# Patient Record
Sex: MALE | Race: AMERICAN INDIAN OR ALASKA NATIVE | NOT HISPANIC OR LATINO | Employment: FULL TIME | URBAN - METROPOLITAN AREA
[De-identification: names, ages, dates, MRNs, and addresses within clinical notes are randomized per-mention and may not be internally consistent; named-entity substitution may affect disease eponyms.]

---

## 2017-01-18 ENCOUNTER — OFFICE VISIT (OUTPATIENT)
Dept: URGENT CARE | Facility: URGENT CARE | Age: 32
End: 2017-01-18

## 2017-01-18 ENCOUNTER — RADIANT APPOINTMENT (OUTPATIENT)
Dept: GENERAL RADIOLOGY | Facility: CLINIC | Age: 32
End: 2017-01-18
Attending: FAMILY MEDICINE

## 2017-01-18 VITALS
RESPIRATION RATE: 18 BRPM | OXYGEN SATURATION: 97 % | TEMPERATURE: 97.3 F | WEIGHT: 201 LBS | DIASTOLIC BLOOD PRESSURE: 86 MMHG | SYSTOLIC BLOOD PRESSURE: 122 MMHG | HEART RATE: 72 BPM

## 2017-01-18 DIAGNOSIS — S99.921A FOOT INJURY, RIGHT, INITIAL ENCOUNTER: Primary | ICD-10-CM

## 2017-01-18 PROCEDURE — 99203 OFFICE O/P NEW LOW 30 MIN: CPT | Performed by: FAMILY MEDICINE

## 2017-01-18 PROCEDURE — 73630 X-RAY EXAM OF FOOT: CPT | Mod: RT

## 2017-01-18 RX ORDER — HYDROCODONE BITARTRATE AND ACETAMINOPHEN 5; 325 MG/1; MG/1
1 TABLET ORAL EVERY 6 HOURS PRN
Qty: 8 TABLET | Refills: 0 | Status: SHIPPED | OUTPATIENT
Start: 2017-01-18 | End: 2017-01-20

## 2017-01-18 NOTE — PROGRESS NOTES
SUBJECTIVE:  Chief Complaint   Patient presents with     Foot Injury     Injured RT foot last night while playing basket ball.   .ident presents with a chief complaint of right foot.  The injury occurred 1 day ago.   The injury happened while playing.   How: sports related injury immediate pain  The patient complained of moderate pain and has had decreased ROM.    Pain exacerbated by weight-bearing and movement    He treated it initially with ice.   This is the first time this type of injury has occurred to this patient.     Past Medical History   Diagnosis Date     NO ACTIVE PROBLEMS        Past Surgical History   Procedure Laterality Date     No history of surgery         No family history on file.    Social History   Substance Use Topics     Smoking status: Current Every Day Smoker     Smokeless tobacco: Never Used     Alcohol Use: No     ROSINTEGUMENTARY/SKIN: NEGATIVE for open wound/bleeding and NEGATIVE for bruising  MUSCULOSKELETAL: NEGATIVE for joint swelling, paresthesias, radicular pain    EXAM:/86 mmHg  Pulse 72  Temp(Src) 97.3  F (36.3  C) (Oral)  Resp 18  Wt 201 lb (91.173 kg)  SpO2 97% Gen: healthy,alert,no distress  Extremity: foot has pain with palpation.   There is not compromise to the distal circulation.  Pulses are +2 and CRT is brisk.GENERAL APPEARANCE: healthy, alert and no distress  EXTREMITIES: peripheral pulses normal  SKIN: no suspicious lesions or rashes  NEURO: Normal strength and tone, sensory exam grossly normal, mentation intact and speech normal    Xray without acute findings, read by Yoni Farr D.O.        ICD-10-CM    1. Foot injury, right, initial encounter S99.921A XR Foot Right G/E 3 Views     order for [facility]     PODIATRY/FOOT & ANKLE SURGERY REFERRAL     HYDROcodone-acetaminophen (NORCO) 5-325 MG per tablet       RICE

## 2017-01-18 NOTE — MR AVS SNAPSHOT
After Visit Summary   1/18/2017    Nathaniel Ybarra    MRN: [id number]           Patient Information     Date Of Birth          1985        Visit Information        Provider Department      1/18/2017 9:30 AM Yoni Farr, DO [facility]        Today's Diagnoses     Foot injury, right, initial encounter    -  1        Follow-ups after your visit        Additional Services     PODIATRY/FOOT & ANKLE SURGERY REFERRAL       Your provider has referred you to: FMG: [facility] [phone]   [email or web address]  FMG: [facility] [phone]   [email or web address]  FMG: [facility] [phone]   [email or web address]  FMG: [facility] [phone]   [email or web address]    Please be aware that coverage of these services is subject to the terms and limitations of your health insurance plan.  Call member services at your health plan with any benefit or coverage questions.      Please bring the following to your appointment:  >>   Any x-rays, CTs or MRIs which have been performed.  Contact the facility where they were done to arrange for  prior to your scheduled appointment.    >>   List of current medications   >>   This referral request   >>   Any documents/labs given to you for this referral                  Who to contact     If you have questions or need follow up information about today's clinic visit or your schedule please contact [facility] directly at [phone].  Normal or non-critical lab and imaging results will be communicated to you by MyChart, letter or phone within 4 business days after the clinic has received the results. If you do not hear from us within 7 days, please contact the clinic through MyChart or phone. If you  "have a critical or abnormal lab result, we will notify you by phone as soon as possible.  Submit refill requests through [employer] or call your pharmacy and they will forward the refill request to us. Please allow 3 business days for your refill to be completed.          Additional Information About Your Visit        [employer] Information     [employer] lets you send messages to your doctor, view your test results, renew your prescriptions, schedule appointments and more. To sign up, go to [email or web address] . Click on \"Log in\" on the left side of the screen, which will take you to the Welcome page. Then click on \"Sign up Now\" on the right side of the page.     You will be asked to enter the access code listed below, as well as some personal information. Please follow the directions to create your username and password.     Your access code is: [email or web address]  Expires: 2017 10:38 AM     Your access code will  in 90 days. If you need help or a new code, please call your [facility] clinic or [phone].        Care EveryWhere ID     This is your Care EveryWhere ID. This could be used by other organizations to access your [facility] medical records  [id number]        Your Vitals Were     Pulse Temperature Respirations Pulse Oximetry          72 97.3  F (36.3  C) (Oral) 18 97%         Blood Pressure from Last 3 Encounters:   17 122/86    Weight from Last 3 Encounters:   17 201 lb (91.173 kg)              We Performed the Following     PODIATRY/FOOT & ANKLE SURGERY REFERRAL     XR Foot Right G/E 3 Views          Today's Medication Changes          These changes are accurate as of: 17 10:38 AM.  If you have any questions, ask your nurse or doctor.               Start taking these medicines.        Dose/Directions    order for DME   Used for:  Foot injury, right, initial encounter   Started by:  Yoni Farr,         Equipment being ordered: RT short cam walker   Quantity:  1 Device   Refills: "  0            Where to get your medicines      Some of these will need a paper prescription and others can be bought over the counter.  Ask your nurse if you have questions.     Bring a paper prescription for each of these medications    - order for DME             Primary Care Provider    None Specified       No primary provider on file.        Thank you!     Thank you for choosing [facility]  for your care. Our goal is always to provide you with excellent care. Hearing back from our patients is one way we can continue to improve our services. Please take a few minutes to complete the written survey that you may receive in the mail after your visit with us. Thank you!             Your Updated Medication List - Protect others around you: Learn how to safely use, store and throw away your medicines at www.disposemymeds.org.          This list is accurate as of: 1/18/17 10:38 AM.  Always use your most recent med list.                   Brand Name Dispense Instructions for use    order for DME     1 Device    Equipment being ordered: RT short cam walker

## 2017-01-18 NOTE — NURSING NOTE
Chief Complaint   Patient presents with     Foot Injury     Injured RT foot last night while playing basket ball.       Initial /86 mmHg  Pulse 72  Temp(Src) 97.3  F (36.3  C) (Oral)  Resp 18  Wt 201 lb (91.173 kg)  SpO2 97% There is no height on file to calculate BMI.  BP completed using cuff size: regular

## 2021-02-13 ENCOUNTER — APPOINTMENT (OUTPATIENT)
Dept: CT IMAGING | Facility: CLINIC | Age: 36
End: 2021-02-13
Attending: EMERGENCY MEDICINE
Payer: MEDICAID

## 2021-02-13 ENCOUNTER — HOSPITAL ENCOUNTER (EMERGENCY)
Facility: CLINIC | Age: 36
Discharge: HOME | End: 2021-02-13
Attending: EMERGENCY MEDICINE | Admitting: EMERGENCY MEDICINE
Payer: MEDICAID

## 2021-02-13 VITALS
HEART RATE: 105 BPM | TEMPERATURE: 98.7 F | HEIGHT: 68 IN | SYSTOLIC BLOOD PRESSURE: 128 MMHG | BODY MASS INDEX: 28.79 KG/M2 | RESPIRATION RATE: 16 BRPM | OXYGEN SATURATION: 98 % | WEIGHT: 190 LBS | DIASTOLIC BLOOD PRESSURE: 102 MMHG

## 2021-02-13 DIAGNOSIS — K57.32 DIVERTICULITIS OF COLON: ICD-10-CM

## 2021-02-13 LAB
ALBUMIN UR-MCNC: NEGATIVE MG/DL
ANION GAP SERPL CALCULATED.3IONS-SCNC: 8 MMOL/L (ref 3–14)
APPEARANCE UR: CLEAR
BASOPHILS # BLD AUTO: 0.1 10E9/L (ref 0–0.2)
BASOPHILS NFR BLD AUTO: 0.4 %
BILIRUB UR QL STRIP: NEGATIVE
BUN SERPL-MCNC: 11 MG/DL (ref 7–30)
CALCIUM SERPL-MCNC: 8.7 MG/DL (ref 8.5–10.1)
CHLORIDE SERPL-SCNC: 107 MMOL/L (ref 94–109)
CO2 SERPL-SCNC: 26 MMOL/L (ref 20–32)
COLOR UR AUTO: YELLOW
CREAT SERPL-MCNC: 0.84 MG/DL (ref 0.66–1.25)
DIFFERENTIAL METHOD BLD: ABNORMAL
EOSINOPHIL # BLD AUTO: 0.1 10E9/L (ref 0–0.7)
EOSINOPHIL NFR BLD AUTO: 1.1 %
ERYTHROCYTE [DISTWIDTH] IN BLOOD BY AUTOMATED COUNT: 11.6 % (ref 10–15)
GFR SERPL CREATININE-BSD FRML MDRD: >90 ML/MIN/{1.73_M2}
GLUCOSE SERPL-MCNC: 91 MG/DL (ref 70–99)
GLUCOSE UR STRIP-MCNC: NEGATIVE MG/DL
HCT VFR BLD AUTO: 46.1 % (ref 40–53)
HGB BLD-MCNC: 15.9 G/DL (ref 13.3–17.7)
HGB UR QL STRIP: NEGATIVE
IMM GRANULOCYTES # BLD: 0 10E9/L (ref 0–0.4)
IMM GRANULOCYTES NFR BLD: 0.3 %
KETONES UR STRIP-MCNC: NEGATIVE MG/DL
LEUKOCYTE ESTERASE UR QL STRIP: NEGATIVE
LYMPHOCYTES # BLD AUTO: 1.4 10E9/L (ref 0.8–5.3)
LYMPHOCYTES NFR BLD AUTO: 11.3 %
MCH RBC QN AUTO: 30.2 PG (ref 26.5–33)
MCHC RBC AUTO-ENTMCNC: 34.5 G/DL (ref 31.5–36.5)
MCV RBC AUTO: 88 FL (ref 78–100)
MONOCYTES # BLD AUTO: 1 10E9/L (ref 0–1.3)
MONOCYTES NFR BLD AUTO: 8.2 %
NEUTROPHILS # BLD AUTO: 9.6 10E9/L (ref 1.6–8.3)
NEUTROPHILS NFR BLD AUTO: 78.7 %
NITRATE UR QL: NEGATIVE
NRBC # BLD AUTO: 0 10*3/UL
NRBC BLD AUTO-RTO: 0 /100
PH UR STRIP: 6.5 PH (ref 5–7)
PLATELET # BLD AUTO: 203 10E9/L (ref 150–450)
POTASSIUM SERPL-SCNC: 3.8 MMOL/L (ref 3.4–5.3)
RBC # BLD AUTO: 5.26 10E12/L (ref 4.4–5.9)
RBC #/AREA URNS AUTO: 0 /HPF (ref 0–2)
SODIUM SERPL-SCNC: 141 MMOL/L (ref 133–144)
SOURCE: NORMAL
SP GR UR STRIP: 1.02 (ref 1–1.03)
UROBILINOGEN UR STRIP-MCNC: 0 MG/DL (ref 0–2)
WBC # BLD AUTO: 12.3 10E9/L (ref 4–11)
WBC #/AREA URNS AUTO: <1 /HPF (ref 0–5)

## 2021-02-13 PROCEDURE — 80048 BASIC METABOLIC PNL TOTAL CA: CPT | Performed by: EMERGENCY MEDICINE

## 2021-02-13 PROCEDURE — 99284 EMERGENCY DEPT VISIT MOD MDM: CPT | Mod: 25

## 2021-02-13 PROCEDURE — 74176 CT ABD & PELVIS W/O CONTRAST: CPT

## 2021-02-13 PROCEDURE — 85025 COMPLETE CBC W/AUTO DIFF WBC: CPT | Performed by: EMERGENCY MEDICINE

## 2021-02-13 PROCEDURE — 81001 URINALYSIS AUTO W/SCOPE: CPT | Performed by: EMERGENCY MEDICINE

## 2021-02-13 ASSESSMENT — ENCOUNTER SYMPTOMS
VOMITING: 0
BACK PAIN: 0
FREQUENCY: 1
ABDOMINAL PAIN: 1
NAUSEA: 0
CONSTIPATION: 0
FEVER: 0
HEMATURIA: 0
DYSURIA: 0
DIARRHEA: 0

## 2021-02-13 ASSESSMENT — MIFFLIN-ST. JEOR: SCORE: 1771.33

## 2021-02-13 NOTE — ED PROVIDER NOTES
"  History     Chief Complaint:  Abdominal Pain and Testicular/scrotal Pain     HPI:   Nathaniel Ybarra is a 35 year old male who presents with worsening low abdominal pain and testicular pain for the last 4 days. Patient states that he has episodes every 2-5 minutes with cramping abdominal pain that radiates to his testicles. He has intermittent periods with no pain. His pain is worse today and he has also had increased urinary frequency. He also mentions that his food and fluid intake have been somewhat reduced today. Denies any inciting trauma, fevers, leg swelling, nausea or vomiting, back pain, hematuria, dysuria, penile discharge, diarrhea, constipation, history of abdominal surgeries or renal stones.    Review of Systems   Constitutional: Negative for fever.   Cardiovascular: Negative for leg swelling.   Gastrointestinal: Positive for abdominal pain. Negative for constipation, diarrhea, nausea and vomiting.   Genitourinary: Positive for frequency and testicular pain. Negative for discharge, dysuria and hematuria.   Musculoskeletal: Negative for back pain.   All other systems reviewed and are negative.    Allergies:  No known drug allergies     Medications:    The patient is not currently taking any prescribed medications.    Past Medical History:  Hypertension    Social History:  Patient presents with partner.    Physical Exam     Vitals:  Patient Vitals for the past 24 hrs:   BP Temp Temp src Pulse Resp SpO2 Height Weight   02/13/21 1408 (!) 128/102 98.7  F (37.1  C) Temporal 105 16 98 % 1.727 m (5' 8\") 86.2 kg (190 lb)       Physical Exam   Constitutional:  Patient is oriented to person, place, and time. They appear well-developed and well-nourished. Mild distress secondary to abdominal and testicular pain   HENT:   Mouth/Throat:   Oropharynx is clear and moist.   Eyes:    Conjunctivae normal and EOM are normal. Pupils are equal, round, and reactive to light.   Neck:    Normal range of motion. "   Cardiovascular: Normal rate, regular rhythm and normal heart sounds.  Exam reveals no gallop and no friction rub.  No murmur heard.  Pulmonary/Chest:  Effort normal and breath sounds normal. Patient has no wheezes. Patient has no rales.   Abdominal:   Soft. Bowel sounds are normal. Patient exhibits no mass. Pain in low mid-pelvic area to palpation. There is no rebound and no guarding.   :   No redness, masses, reproducible tenderness, no lesions.  Musculoskeletal:  Normal range of motion. Patient exhibits no edema.   Neurological:   Patient is alert and oriented to person, place, and time. Patient has normal strength. No cranial nerve deficit or sensory deficit. GCS 15  Skin:   Skin is warm and dry. No rash noted. No erythema.   Psychiatric:   Patient has a normal mood and affect. Patient's behavior is normal. Judgment and thought content normal.     Emergency Department Course     Imaging:  CT Abdomen/pelvis wo contrast  1.  Acute sigmoid diverticulitis.  2.  No evidence for associated diverticular abscess.  Per radiology.    Laboratory:  CBC: WBC 12.3 (H), HGB 15.9,     BMP:  WNL (Creatinine 0.84)    Urine analysis: WNL    Emergency Department Course:  Reviewed:  1418  Past medical records, Care Everywhere, nursing notes, and vitals reviewed.     Assessments:  1418 I performed an exam of the patient and obtained history, as documented above.    Disposition:  1623 The patient was discharged to home.     I personally reviewed the laboratory & imaging results with the Patient who voiced understanding of the findings. I answered all related questions prior to discharge.     Impression & Plan     Medical Decision Making:   Nathaniel Ybarra presented with abdominal pain and CT confirms diverticulitis without abscess or perforation.  This is consistent with his exam. No concern for testicular pathology base don exam, I believe the sigmoid diverticulitis is radiating pain. His pain is controlled here in the  emergency department.  This represents uncomplicated diverticulitis at this time.  The natural history of this diagnosis was discussed, and I educated the patient regarding the symptoms and signs that should prompt return to the Emergency Department.  This would include worsening fevers, chills, vomiting, and more intense pain.  Nathaniel is to take antibiotics and pain medications as directed.  Follow-up with GI/primary care physician is indicated in 1-2 days, referral proivided.    Diagnosis:    ICD-10-CM    1. Diverticulitis of colon  K57.32         Discharge Medications:  Discharge Medication List as of 2/13/2021  4:45 PM      START taking these medications    Details   amoxicillin-clavulanate (AUGMENTIN) 875-125 MG tablet Take 1 tablet by mouth 2 times daily for 10 days, Disp-20 tablet, R-0, Local Print             Scribe Disclosure:  I, Maria Luisa Glaser, am serving as a scribe at 2:18 PM on 2/13/2021 to document services personally performed by Latanya Wright MD based on my observations and the provider's statements to me.       Maria Luisa Glaser  2/13/2021     Latanya Wright MD  02/13/21 5531

## 2021-02-13 NOTE — ED TRIAGE NOTES
Lower mid abdominal pain that radiates into the testicles. States he has had for a couple days, pain worse in the AM. Pt denies any urinary symptoms.

## 2021-02-15 ENCOUNTER — NURSE TRIAGE (OUTPATIENT)
Dept: NURSING | Facility: CLINIC | Age: 36
End: 2021-02-15

## 2021-02-15 NOTE — TELEPHONE ENCOUNTER
"RN Triage:  NO PCP or Clinic    Spoke with 35 yr old Nathaniel who was seen at [facility] ER 2 days ago and dx with diverticulitis.    Prescribed Augmentin x 10 days and advised to follow up with Gastroenterology.    Patient states he woke up with sore eyes this morning.  \"felt like he was punched in the eyes.\"    Denies swelling.    Whites of the eyes are slightly red.  Rates discomfort a 4-5 on a 0-10 pain this morning.  Feels pain is improving.    Denies drainage.    Denies fever.    Denies vision changes.    Reports feeling very tired last night, but kept his eyes open and watched quite a bit of TV.    Wondering if he is having side effects from the AB?    PLAN:  Advised to contact pharmacist at [facility] and inquire if eye strain/photosensitiviy is a side effect of Augmentin.  Advised to make appointment Gastroenterology and follow up with questions/concerns.  Advised to monitor eye discomfort and if pain worsens to be seen in Urgent Care.  Advised if eye discomfort worsens or continues beyond 24 hours to call back.    Sharla Knapp RN  [facility]      Additional Information    Negative: Chemical got in the eye    Negative: Piece of something got in the eye    Negative: Followed an eye injury    Negative: Yellow or green pus in the eyes    Negative: Severe eye pain    Negative: Patient sounds very sick or weak to the triager    Negative: Blurred vision    Negative: Cloudy spot or sore seen on the cornea (clear part of the eye)    Negative: Eyelids are very swollen (shut or almost)    Negative: Eyelid (outer) is very red    Negative: Vomiting    Negative: Foreign body sensation ('feels like something is in there')    Negative: Recent eye surgery and has increasing eye pain    Negative: Eye pain/discomfort that is more than mild    Negative: Patient wants to be seen    Negative: Eye pain present > 24 hours    Negative: Bleeding on white of the eye and is taking Coumadin or known bleeding disorder (e.g., " thrombocytopenia)    Negative: Only 1 eye is red, and persists > 48 hours    Negative: Red eyes present > 7 days    Negative: Bleeding on white of the eye    Negative: Red eye caused by sunscreen, smoke, smog, chlorine, food, soap or other mild irritant    Negative: Red eye caused by contact lens    Red eye and no complications    Protocols used: EYE - RED WITHOUT PUS-A-[place]

## 2021-03-12 ENCOUNTER — TRANSFERRED RECORDS (OUTPATIENT)
Dept: HEALTH INFORMATION MANAGEMENT | Facility: CLINIC | Age: 36
End: 2021-03-12

## 2021-05-15 ENCOUNTER — HOSPITAL ENCOUNTER (EMERGENCY)
Dept: EMERGENCY MEDICINE | Facility: CLINIC | Age: 36
Discharge: HOME | End: 2021-05-16
Attending: EMERGENCY MEDICINE
Payer: MEDICAID

## 2021-05-15 ENCOUNTER — COMMUNICATION - HEALTHEAST (OUTPATIENT)
Dept: SCHEDULING | Facility: CLINIC | Age: 36
End: 2021-05-15

## 2021-05-15 DIAGNOSIS — R07.9 CHEST PAIN, UNSPECIFIED TYPE: ICD-10-CM

## 2021-05-15 LAB
ALBUMIN SERPL-MCNC: 3.7 G/DL (ref 3.5–5)
ALP SERPL-CCNC: 99 U/L (ref 45–120)
ALT SERPL W P-5'-P-CCNC: 53 U/L (ref 0–45)
ANION GAP SERPL CALCULATED.3IONS-SCNC: 10 MMOL/L (ref 5–18)
AST SERPL W P-5'-P-CCNC: 30 U/L (ref 0–40)
BILIRUB SERPL-MCNC: 0.3 MG/DL (ref 0–1)
BUN SERPL-MCNC: 12 MG/DL (ref 8–22)
CALCIUM SERPL-MCNC: 8.5 MG/DL (ref 8.5–10.5)
CHLORIDE BLD-SCNC: 109 MMOL/L (ref 98–107)
CO2 SERPL-SCNC: 23 MMOL/L (ref 22–31)
CREAT SERPL-MCNC: 0.81 MG/DL (ref 0.7–1.3)
D DIMER PPP FEU-MCNC: <=0.27 FEU UG/ML
ERYTHROCYTE [DISTWIDTH] IN BLOOD BY AUTOMATED COUNT: 11.8 % (ref 11–14.5)
GFR SERPL CREATININE-BSD FRML MDRD: >60 ML/MIN/1.73M2
GLUCOSE BLD-MCNC: 126 MG/DL (ref 70–125)
HCT VFR BLD AUTO: 43.7 % (ref 40–54)
HGB BLD-MCNC: 15.5 G/DL (ref 14–18)
MCH RBC QN AUTO: 31.6 PG (ref 27–34)
MCHC RBC AUTO-ENTMCNC: 35.5 G/DL (ref 32–36)
MCV RBC AUTO: 89 FL (ref 80–100)
PLATELET # BLD AUTO: 197 THOU/UL (ref 140–440)
PMV BLD AUTO: 11.5 FL (ref 8.5–12.5)
POTASSIUM BLD-SCNC: 3.8 MMOL/L (ref 3.5–5)
PROT SERPL-MCNC: 7.2 G/DL (ref 6–8)
RBC # BLD AUTO: 4.91 MILL/UL (ref 4.4–6.2)
SODIUM SERPL-SCNC: 142 MMOL/L (ref 136–145)
TROPONIN I SERPL-MCNC: <0.01 NG/ML (ref 0–0.29)
WBC: 7 THOU/UL (ref 4–11)

## 2021-05-16 LAB — TROPONIN I SERPL-MCNC: <0.01 NG/ML (ref 0–0.29)

## 2021-05-20 LAB
ATRIAL RATE - MUSE: 67 BPM
ATRIAL RATE - MUSE: 85 BPM
DIASTOLIC BLOOD PRESSURE - MUSE: 85 MMHG
DIASTOLIC BLOOD PRESSURE - MUSE: 96 MMHG
INTERPRETATION ECG - MUSE: NORMAL
INTERPRETATION ECG - MUSE: NORMAL
P AXIS - MUSE: 43 DEGREES
P AXIS - MUSE: 8 DEGREES
PR INTERVAL - MUSE: 142 MS
PR INTERVAL - MUSE: 158 MS
QRS DURATION - MUSE: 82 MS
QRS DURATION - MUSE: 84 MS
QT - MUSE: 378 MS
QT - MUSE: 384 MS
QTC - MUSE: 399 MS
QTC - MUSE: 456 MS
R AXIS - MUSE: -16 DEGREES
R AXIS - MUSE: -19 DEGREES
SYSTOLIC BLOOD PRESSURE - MUSE: 119 MMHG
SYSTOLIC BLOOD PRESSURE - MUSE: 154 MMHG
T AXIS - MUSE: 11 DEGREES
T AXIS - MUSE: 16 DEGREES
VENTRICULAR RATE- MUSE: 67 BPM
VENTRICULAR RATE- MUSE: 85 BPM

## 2021-05-27 VITALS — WEIGHT: 190 LBS | BODY MASS INDEX: 28.89 KG/M2

## 2021-05-30 ENCOUNTER — RECORDS - HEALTHEAST (OUTPATIENT)
Dept: ADMINISTRATIVE | Facility: CLINIC | Age: 36
End: 2021-05-30

## 2021-06-17 NOTE — ED PROVIDER NOTES
ED CONSULTATION  Date/Time:5/16/2021 10:23 PM    I am seeing this patient along with ADRIANNA Sinha.  ISoto have reviewed the documentation, personally taken the patient's history, performed an exam and agree with the physical finds, diagnosis and management plan.    HPI: Nathaniel Ybarra is a 35 y.o. male who presents with dizziness, chest pain, and dyspnea which developed while he was smoking a cigarette. He first became lightheaded before beginning to develop dyspnea and then mild chest pain, x 30 minutes. He also had left arm pain for 5 minutes. Patient recently finished quarantine after recovering from a mild case of COVID-19.     I, Rylee Yakymi, am serving as a scribe to document services personally performed by Soto Muhammad MD, based on my observation and the provider's statements to me. ISoto MD attest that Rylee Yakymi is acting in a scribe capacity, has observed my performance of the services and has documented them in accordance with my direction.      Physical Exam:/83   Pulse 68   Temp 97.8  F (36.6  C) (Oral)   Resp 15   Wt 190 lb (86.2 kg)   SpO2 98%   Constitutional:  Well developed, Well nourished  HENT:  Normocephalic, Atraumatic, Bilateral external ears normal, Oropharynx moist, No oral exudates, Nose normal. Neck- Normal range of motion   Eyes: Conjunctiva normal, No discharge.   Respiratory:  Normal breath sounds, No respiratory distress, No wheezing  Cardiovascular:  Normal heart rate, Normal rhythm.   GI:  Soft, No tenderness, No masses, No flank tenderness.   Musculoskeletal: Full ROM  Integument:  Warm, Dry, No erythema, No rash.    Neurologic:  Alert & oriented.  No focal deficits appreciated  Psychiatric:  Affect normal, Judgment normal, Mood normal.     MDM:35 y.o. with chest pain.  Seems unlikely cardiac.  Troponin x2 are negative.  EKGs are normal.  D-dimer is negative.  Patient low risk for PE.  No pneumonia or pneumothorax.  Patient feeling  better here.  Will be discharged home.  Follow-up with rapid access.    EKG  Time 0025  Impression: Normal sinus rhythm.  No acute ischemic changes.  Unchanged from previous dated May 15.  Normal sinus rhythm retrograde 85.  Interval 158.  QRS 82.  QTc 456.  Above EKG is reviewed interpreted by myself         1. Chest pain, unspecified type        Results for orders placed or performed during the hospital encounter of 05/15/21   HM2(CBC w/o Differential)   Result Value Ref Range    WBC 7.0 4.0 - 11.0 thou/uL    RBC 4.91 4.40 - 6.20 mill/uL    Hemoglobin 15.5 14.0 - 18.0 g/dL    Hematocrit 43.7 40.0 - 54.0 %    MCV 89 80 - 100 fL    MCH 31.6 27.0 - 34.0 pg    MCHC 35.5 32.0 - 36.0 g/dL    RDW 11.8 11.0 - 14.5 %    Platelets 197 140 - 440 thou/uL    MPV 11.5 8.5 - 12.5 fL   Comprehensive Metabolic Panel   Result Value Ref Range    Sodium 142 136 - 145 mmol/L    Potassium 3.8 3.5 - 5.0 mmol/L    Chloride 109 (H) 98 - 107 mmol/L    CO2 23 22 - 31 mmol/L    Anion Gap, Calculation 10 5 - 18 mmol/L    Glucose 126 (H) 70 - 125 mg/dL    BUN 12 8 - 22 mg/dL    Creatinine 0.81 0.70 - 1.30 mg/dL    GFR MDRD Af Amer >60 >60 mL/min/1.73m2    GFR MDRD Non Af Amer >60 >60 mL/min/1.73m2    Bilirubin, Total 0.3 0.0 - 1.0 mg/dL    Calcium 8.5 8.5 - 10.5 mg/dL    Protein, Total 7.2 6.0 - 8.0 g/dL    Albumin 3.7 3.5 - 5.0 g/dL    Alkaline Phosphatase 99 45 - 120 U/L    AST 30 0 - 40 U/L    ALT 53 (H) 0 - 45 U/L   Troponin I   Result Value Ref Range    Troponin I <0.01 0.00 - 0.29 ng/mL   D-dimer, Quantitative   Result Value Ref Range    D-Dimer, Quant <=0.27 <=0.50 FEU ug/mL   Troponin I   Result Value Ref Range    Troponin I <0.01 0.00 - 0.29 ng/mL     Xr Chest 2 Views    Result Date: 5/15/2021  EXAM: XR CHEST 2 VIEWS LOCATION: [facility] DATE/TIME: 5/15/2021 10:06 PM INDICATION: chest pain COMPARISON: 05/11/2011.     Negative chest.       There are no discharge medications for this patient.      Final  disposition will be per the depending diagnostic studies and patient's clinical trajectory.    This is an accurate record of my words and actions during this visit as documented by the scribe.          Soto Muhammad MD  05/16/21 7100

## 2021-06-17 NOTE — TELEPHONE ENCOUNTER
"Pt is calling.    Pain in the left side of his chest and left arm x 15-20 minutes. History of hypertension and not on medication. Smoker. Symptoms started while smoking. Mild shortness of breath.   Care advice reviewed. I advised him to go into the nearest ED for evaluation. He verbalized understanding and will go now.    Reason for Disposition    Pain also in shoulder(s) or arm(s) or jaw    Additional Information    Negative: Severe difficulty breathing (e.g., struggling for each breath, speaks in single words)    Negative: Difficult to awaken or acting confused (e.g., disoriented, slurred speech)    Negative: Shock suspected (e.g., cold/pale/clammy skin, too weak to stand, low BP, rapid pulse)    Negative: [1] Chest pain lasts > 5 minutes AND [2] age > 45    Negative: [1] Chest pain lasts > 5 minutes AND [2] age > 30 AND [3] one or more cardiac risk factors (e.g., diabetes, high blood pressure, high cholesterol, smoker, or strong family history of heart disease)    Negative: [1] Chest pain lasts > 5 minutes AND [2] history of heart disease (i.e., angina, heart attack, heart failure, bypass surgery, takes nitroglycerin)    Negative: [1] Chest pain lasts > 5 minutes AND [2] described as crushing, pressure-like, or heavy    Negative: Passed out (i.e., lost consciousness, collapsed and was not responding)    Negative: Heart beating < 50 beats per minute OR > 140 beats per minute    Negative: Visible sweat on face or sweat dripping down face    Negative: Sounds like a life-threatening emergency to the triager    Negative: Followed a chest injury    Negative: SEVERE chest pain    Negative: [1] Chest pain (or \"angina\") comes and goes AND [2] is happening more often (increasing in frequency) or getting worse (increasing in severity)    Protocols used: CHEST PAIN-A-    Sharla Giordano RN   [facility]  05/15/21 at 8:45 PM    "

## 2021-06-17 NOTE — ED TRIAGE NOTES
Patient reports that @1900 tonight he began to have L sided chest pain and L arm pain, no longer has this.  Disha Sylvester RN.......5/15/2021 9:13 PM

## 2021-06-17 NOTE — ED PROVIDER NOTES
EMERGENCY DEPARTMENT ENCOUNTER      NAME: Nathaniel Ybarra  AGE: 35 y.o. male  YOB: 1985  MRN: [id number]  EVALUATION DATE & TIME: 5/15/2021  9:06 PM    PCP: No primary care provider on file.    ED PROVIDER: Lesly Garcia PA-C      Chief Complaint   Patient presents with     Chest Pain         FINAL IMPRESSION:  Chest pain      MEDICAL DECISION MAKING:    Pertinent Labs & Imaging studies reviewed. (See chart for details)  35 y.o. male presents to the Emergency Department for evaluation of dizziness, chest pain and shortness of breath.  Symptoms occurred this evening while he was smoking a cigarette.  He reports feeling lightheaded and then started to feel short of breath and some pain in his chest for about 30 minutes.  He also had about 5 minutes of left arm pain.  He called the nursing line was told to come to the ER.  He reports that he has been told he has high blood pressure but he does not take anything for it.  He just got out of quarantine for COVID-19.    Here vitals are stable.  He is a little tachypneic on vital signs initially but on my examination is not at all.  He has some rhonchi in the lungs, no wheezing.  He is neuro intact.  Nothing to suggest CVA. Heart score is 1 if truly has h/o HTN. ACS is on differential. Also consider PE with recent covid and potential hyper coagulopathy.  Also consider viral URI, anxiety, pneumonia, pneumothorax, pleurisy.      Initiated workup with EKG, labs. D-dimer was negative so CXR obtained.     EKG and lab work unremarkable.  Chest x-ray was clear.    Given symptoms started shortly before his arrival to the ER, did elect to do a delta troponin and EKG.  I think that if these are negative, the patient can be discharged with follow-up recommendations with PCP and/or cardiology clinic.    Patient was signed out to Dr. Muhammad at 12:30 AM pending repeat troponin and EKG.       At the conclusion of the encounter I discussed the results of all of the tests and the  disposition. The questions were answered. The patient or family acknowledged understanding and was agreeable with the care plan.         ED COURSE  9:10 PM  Met and evaluated patient. Discussed ED plan.   10:23 PM staffed patient with Dr. Muhammad  10:45 PM Updated patient on results and plan for delta troponin and EKG  12:12 AM Updated patient on plan.  Patient care will be signed out awaiting trop/EKG repeat      MEDICATIONS GIVEN IN THE EMERGENCY:  Medications   albuterol nebulizer solution 2.5 mg (PROVENTIL) (has no administration in time range)   aspirin chewable tablet 324 mg (324 mg Oral Given 5/15/21 2128)       NEW PRESCRIPTIONS STARTED AT TODAY'S ER VISIT  New Prescriptions    No medications on file          =================================================================    HPI    Patient information was obtained from: patient    Use of Intrepreter: N/A       Nathaniel Ybarra is a 35 y.o. male who presents with chest pain.    The patient states that he just recently recovered from a mild case of COVID-19. He just finished his 11th day of quarantine 2 days ago on 5/13, stating his symptoms all resolved. Tonight at around 7 PM, he experienced lightheadedness that got worse with smoking. He then started to feel short of breath and felt pain in his chest intermittently for about 30 minutes. After the chest pain stopped he felt a more consistent pain in his left arm, and it felt like the arm was weak. He called the nurse line and was told to come in. Most of his symptoms have all resolved, and currently the patient only reports feeling mildly lightheaded. He denies any nausea, vomiting, numbness or tingling in the legs, changes to appetite, or other symptoms. He denies family history of hear disease.       REVIEW OF SYSTEMS   Constitutional: Negative for fevers, chills, appetite change  Pulmonary: positive for shortness of breath  Cardiac: positive for chest pain  GI: Negative for nausea, vomiting, diarrhea,  abdominal pain  : Negative for urinary symptoms  Musculoskeletal: positive for left arm pain  Neuro: positive for lightheadedness, positive for left arm weakness, Negative for weakness, negative numbness or tingling in legs    All other systems reviewed and are negative       PAST MEDICAL HISTORY:  History reviewed. No pertinent past medical history documented. Reports history of: HTN, COVID-19    PAST SURGICAL HISTORY:  History reviewed. No pertinent surgical history.        CURRENT MEDICATIONS:    No current facility-administered medications on file prior to encounter.      No current outpatient medications on file prior to encounter.       ALLERGIES:  No Known Allergies    FAMILY HISTORY:  History reviewed. No pertinent family history.    SOCIAL HISTORY:   Smoking: half a pack of cigarettes a day  Alcohol: no  Drug: no    VITALS:  Patient Vitals for the past 24 hrs:   BP Temp Temp src Pulse Resp SpO2 Weight   05/15/21 2300 131/78 -- -- 82 24 96 % --   05/15/21 2245 (!) 141/96 -- -- 81 19 97 % --   05/15/21 2230 (!) 127/93 -- -- 65 21 96 % --   05/15/21 2215 (!) 126/95 -- -- 64 21 96 % --   05/15/21 2200 (!) 124/91 -- -- 67 27 97 % --   05/15/21 2145 125/89 -- -- 64 17 97 % --   05/15/21 2130 131/86 -- -- 67 19 97 % --   05/15/21 2115 (!) 142/96 -- -- 68 14 98 % --   05/15/21 2105 (!) 155/116 97.8  F (36.6  C) Oral 67 18 98 % 190 lb (86.2 kg)       PHYSICAL EXAM    General Appearance:  Alert, cooperative, no distress, appears stated age  HENT: Normocephalic without obvious deformity, atraumatic. Mucous membranes moist   Eyes: Conjunctiva clear, Lids normal. No discharge.   Respiratory: No distress. Mild rhonchi. No wheezes  Cardiovascular: Regular rate and rhythm, no murmur. Normal cap refill. No peripheral edema  GI: Abdomen soft, nontender, normal bowel sounds  : No CVA tenderness  Musculoskeletal: Moving all extremities. No swelling.   Integument: Warm, dry, no rashes or lesions  Neurologic: Alert and  orientated x3. No focal deficits.  Psych: Anxious, cooperative        LAB:  All pertinent labs reviewed and interpreted.  Results for orders placed or performed during the hospital encounter of 05/15/21   HM2(CBC w/o Differential)   Result Value Ref Range    WBC 7.0 4.0 - 11.0 thou/uL    RBC 4.91 4.40 - 6.20 mill/uL    Hemoglobin 15.5 14.0 - 18.0 g/dL    Hematocrit 43.7 40.0 - 54.0 %    MCV 89 80 - 100 fL    MCH 31.6 27.0 - 34.0 pg    MCHC 35.5 32.0 - 36.0 g/dL    RDW 11.8 11.0 - 14.5 %    Platelets 197 140 - 440 thou/uL    MPV 11.5 8.5 - 12.5 fL   Comprehensive Metabolic Panel   Result Value Ref Range    Sodium 142 136 - 145 mmol/L    Potassium 3.8 3.5 - 5.0 mmol/L    Chloride 109 (H) 98 - 107 mmol/L    CO2 23 22 - 31 mmol/L    Anion Gap, Calculation 10 5 - 18 mmol/L    Glucose 126 (H) 70 - 125 mg/dL    BUN 12 8 - 22 mg/dL    Creatinine 0.81 0.70 - 1.30 mg/dL    GFR MDRD Af Amer >60 >60 mL/min/1.73m2    GFR MDRD Non Af Amer >60 >60 mL/min/1.73m2    Bilirubin, Total 0.3 0.0 - 1.0 mg/dL    Calcium 8.5 8.5 - 10.5 mg/dL    Protein, Total 7.2 6.0 - 8.0 g/dL    Albumin 3.7 3.5 - 5.0 g/dL    Alkaline Phosphatase 99 45 - 120 U/L    AST 30 0 - 40 U/L    ALT 53 (H) 0 - 45 U/L   Troponin I   Result Value Ref Range    Troponin I <0.01 0.00 - 0.29 ng/mL   D-dimer, Quantitative   Result Value Ref Range    D-Dimer, Quant <=0.27 <=0.50 FEU ug/mL       RADIOLOGY:  Reviewed all pertinent imaging. Please see official radiology report    Xr Chest 2 Views    Result Date: 5/15/2021  EXAM: XR CHEST 2 VIEWS LOCATION: [facility] DATE/TIME: 5/15/2021 10:06 PM INDICATION: chest pain COMPARISON: 05/11/2011.     Negative chest.      EKG:    Performed at: 2110  Impression: normal sinus rhythm. No ischemic findings  Dr. Harris and I have independently reviewed and interpreted the EKG(s) documented above.    PROCEDURES:   none      CLIFFORD, Omar Cruz, am serving as a scribe to document services personally performed by  Lesly Garcia PA-C based on my observation and the provider's statements to me. I, Lesly Garcia PA-C attest that Omar Nancy is acting in a scribe capacity, has observed my performance of the services and has documented them in accordance with my direction.      Lesly Garcia PA-C  Emergency Medicine  [facility] EMERGENCY ROOM  [address] [address]  Dept: [phone]  Loc: [phone]     Lesly Garcia PA-C  05/16/21 0012

## 2021-08-12 ENCOUNTER — APPOINTMENT (OUTPATIENT)
Dept: CT IMAGING | Facility: HOSPITAL | Age: 36
DRG: 392 | End: 2021-08-12
Attending: EMERGENCY MEDICINE
Payer: COMMERCIAL

## 2021-08-12 ENCOUNTER — NURSE TRIAGE (OUTPATIENT)
Dept: NURSING | Facility: CLINIC | Age: 36
End: 2021-08-12

## 2021-08-12 ENCOUNTER — HOSPITAL ENCOUNTER (INPATIENT)
Facility: HOSPITAL | Age: 36
LOS: 1 days | Discharge: HOME | DRG: 392 | End: 2021-08-13
Attending: EMERGENCY MEDICINE | Admitting: FAMILY MEDICINE
Payer: COMMERCIAL

## 2021-08-12 DIAGNOSIS — K63.2 COLONIC FISTULA: ICD-10-CM

## 2021-08-12 DIAGNOSIS — K57.32 DIVERTICULITIS OF COLON: Primary | ICD-10-CM

## 2021-08-12 LAB
ALBUMIN SERPL-MCNC: 3.6 G/DL (ref 3.5–5)
ALBUMIN UR-MCNC: 30 MG/DL
ALP SERPL-CCNC: 122 U/L (ref 45–120)
ALT SERPL W P-5'-P-CCNC: 29 U/L (ref 0–45)
ANION GAP SERPL CALCULATED.3IONS-SCNC: 8 MMOL/L (ref 5–18)
APPEARANCE UR: CLEAR
APTT PPP: 36 SECONDS (ref 22–38)
AST SERPL W P-5'-P-CCNC: 22 U/L (ref 0–40)
BASOPHILS # BLD AUTO: 0 10E3/UL (ref 0–0.2)
BASOPHILS NFR BLD AUTO: 0 %
BILIRUB DIRECT SERPL-MCNC: 0.4 MG/DL
BILIRUB SERPL-MCNC: 1.1 MG/DL (ref 0–1)
BILIRUB UR QL STRIP: NEGATIVE
BUN SERPL-MCNC: 7 MG/DL (ref 8–22)
CALCIUM SERPL-MCNC: 9.4 MG/DL (ref 8.5–10.5)
CHLORIDE BLD-SCNC: 110 MMOL/L (ref 98–107)
CO2 SERPL-SCNC: 23 MMOL/L (ref 22–31)
COLOR UR AUTO: YELLOW
CREAT SERPL-MCNC: 0.86 MG/DL (ref 0.7–1.3)
EOSINOPHIL # BLD AUTO: 0.3 10E3/UL (ref 0–0.7)
EOSINOPHIL NFR BLD AUTO: 2 %
ERYTHROCYTE [DISTWIDTH] IN BLOOD BY AUTOMATED COUNT: 12.7 % (ref 10–15)
GFR SERPL CREATININE-BSD FRML MDRD: >90 ML/MIN/1.73M2
GLUCOSE BLD-MCNC: 114 MG/DL (ref 70–125)
GLUCOSE UR STRIP-MCNC: NEGATIVE MG/DL
HCT VFR BLD AUTO: 48.4 % (ref 40–53)
HGB BLD-MCNC: 16.5 G/DL (ref 13.3–17.7)
HGB UR QL STRIP: NEGATIVE
IMM GRANULOCYTES # BLD: 0.1 10E3/UL
IMM GRANULOCYTES NFR BLD: 0 %
INR PPP: 1 (ref 0.85–1.15)
KETONES UR STRIP-MCNC: NEGATIVE MG/DL
LEUKOCYTE ESTERASE UR QL STRIP: NEGATIVE
LIPASE SERPL-CCNC: <9 U/L (ref 0–52)
LYMPHOCYTES # BLD AUTO: 1.7 10E3/UL (ref 0.8–5.3)
LYMPHOCYTES NFR BLD AUTO: 14 %
MCH RBC QN AUTO: 30.7 PG (ref 26.5–33)
MCHC RBC AUTO-ENTMCNC: 34.1 G/DL (ref 31.5–36.5)
MCV RBC AUTO: 90 FL (ref 78–100)
MONOCYTES # BLD AUTO: 1.1 10E3/UL (ref 0–1.3)
MONOCYTES NFR BLD AUTO: 9 %
MUCOUS THREADS #/AREA URNS LPF: PRESENT /LPF
NEUTROPHILS # BLD AUTO: 9.6 10E3/UL (ref 1.6–8.3)
NEUTROPHILS NFR BLD AUTO: 75 %
NITRATE UR QL: NEGATIVE
NRBC # BLD AUTO: 0 10E3/UL
NRBC BLD AUTO-RTO: 0 /100
PH UR STRIP: 6 [PH] (ref 5–7)
PLATELET # BLD AUTO: 191 10E3/UL (ref 150–450)
POTASSIUM BLD-SCNC: 3.7 MMOL/L (ref 3.5–5)
PROT SERPL-MCNC: 7.8 G/DL (ref 6–8)
RBC # BLD AUTO: 5.38 10E6/UL (ref 4.4–5.9)
RBC URINE: 1 /HPF
SARS-COV-2 RNA RESP QL NAA+PROBE: NEGATIVE
SODIUM SERPL-SCNC: 141 MMOL/L (ref 136–145)
SP GR UR STRIP: 1.03 (ref 1–1.03)
UROBILINOGEN UR STRIP-MCNC: 2 MG/DL
WBC # BLD AUTO: 12.8 10E3/UL (ref 4–11)
WBC URINE: 1 /HPF

## 2021-08-12 PROCEDURE — 250N000011 HC RX IP 250 OP 636: Performed by: EMERGENCY MEDICINE

## 2021-08-12 PROCEDURE — 82248 BILIRUBIN DIRECT: CPT | Performed by: EMERGENCY MEDICINE

## 2021-08-12 PROCEDURE — 85025 COMPLETE CBC W/AUTO DIFF WBC: CPT | Performed by: EMERGENCY MEDICINE

## 2021-08-12 PROCEDURE — 258N000003 HC RX IP 258 OP 636: Performed by: FAMILY MEDICINE

## 2021-08-12 PROCEDURE — 99221 1ST HOSP IP/OBS SF/LOW 40: CPT | Performed by: SPECIALIST

## 2021-08-12 PROCEDURE — 85730 THROMBOPLASTIN TIME PARTIAL: CPT | Performed by: EMERGENCY MEDICINE

## 2021-08-12 PROCEDURE — 87635 SARS-COV-2 COVID-19 AMP PRB: CPT | Performed by: EMERGENCY MEDICINE

## 2021-08-12 PROCEDURE — 74177 CT ABD & PELVIS W/CONTRAST: CPT

## 2021-08-12 PROCEDURE — 120N000001 HC R&B MED SURG/OB

## 2021-08-12 PROCEDURE — 82310 ASSAY OF CALCIUM: CPT | Performed by: EMERGENCY MEDICINE

## 2021-08-12 PROCEDURE — 99233 SBSQ HOSP IP/OBS HIGH 50: CPT | Mod: AI

## 2021-08-12 PROCEDURE — 250N000011 HC RX IP 250 OP 636: Performed by: FAMILY MEDICINE

## 2021-08-12 PROCEDURE — C9803 HOPD COVID-19 SPEC COLLECT: HCPCS

## 2021-08-12 PROCEDURE — 96365 THER/PROPH/DIAG IV INF INIT: CPT

## 2021-08-12 PROCEDURE — 83690 ASSAY OF LIPASE: CPT | Performed by: EMERGENCY MEDICINE

## 2021-08-12 PROCEDURE — 36415 COLL VENOUS BLD VENIPUNCTURE: CPT | Performed by: EMERGENCY MEDICINE

## 2021-08-12 PROCEDURE — 80053 COMPREHEN METABOLIC PANEL: CPT | Performed by: EMERGENCY MEDICINE

## 2021-08-12 PROCEDURE — 96375 TX/PRO/DX INJ NEW DRUG ADDON: CPT

## 2021-08-12 PROCEDURE — 81001 URINALYSIS AUTO W/SCOPE: CPT | Performed by: EMERGENCY MEDICINE

## 2021-08-12 PROCEDURE — 87040 BLOOD CULTURE FOR BACTERIA: CPT | Performed by: EMERGENCY MEDICINE

## 2021-08-12 PROCEDURE — 85610 PROTHROMBIN TIME: CPT | Performed by: EMERGENCY MEDICINE

## 2021-08-12 PROCEDURE — 99285 EMERGENCY DEPT VISIT HI MDM: CPT | Mod: 25

## 2021-08-12 RX ORDER — LIDOCAINE 40 MG/G
CREAM TOPICAL
Status: DISCONTINUED | OUTPATIENT
Start: 2021-08-12 | End: 2021-08-13 | Stop reason: HOSPADM

## 2021-08-12 RX ORDER — NALOXONE HYDROCHLORIDE 0.4 MG/ML
0.4 INJECTION, SOLUTION INTRAMUSCULAR; INTRAVENOUS; SUBCUTANEOUS
Status: DISCONTINUED | OUTPATIENT
Start: 2021-08-12 | End: 2021-08-13 | Stop reason: HOSPADM

## 2021-08-12 RX ORDER — HYDROMORPHONE HYDROCHLORIDE 2 MG/1
2 TABLET ORAL EVERY 4 HOURS PRN
Status: CANCELLED | OUTPATIENT
Start: 2021-08-12

## 2021-08-12 RX ORDER — HYDROMORPHONE HYDROCHLORIDE 1 MG/ML
0.5 INJECTION, SOLUTION INTRAMUSCULAR; INTRAVENOUS; SUBCUTANEOUS EVERY 4 HOURS PRN
Status: DISCONTINUED | OUTPATIENT
Start: 2021-08-12 | End: 2021-08-13 | Stop reason: HOSPADM

## 2021-08-12 RX ORDER — ACETAMINOPHEN 325 MG/1
650 TABLET ORAL EVERY 6 HOURS PRN
Status: DISCONTINUED | OUTPATIENT
Start: 2021-08-12 | End: 2021-08-13 | Stop reason: HOSPADM

## 2021-08-12 RX ORDER — ACETAMINOPHEN 650 MG/1
650 SUPPOSITORY RECTAL EVERY 6 HOURS PRN
Status: DISCONTINUED | OUTPATIENT
Start: 2021-08-12 | End: 2021-08-13 | Stop reason: HOSPADM

## 2021-08-12 RX ORDER — ONDANSETRON 2 MG/ML
4 INJECTION INTRAMUSCULAR; INTRAVENOUS EVERY 6 HOURS PRN
Status: DISCONTINUED | OUTPATIENT
Start: 2021-08-12 | End: 2021-08-13 | Stop reason: HOSPADM

## 2021-08-12 RX ORDER — LIDOCAINE 40 MG/G
CREAM TOPICAL
Status: CANCELLED | OUTPATIENT
Start: 2021-08-12

## 2021-08-12 RX ORDER — PIPERACILLIN SODIUM, TAZOBACTAM SODIUM 3; .375 G/15ML; G/15ML
3.38 INJECTION, POWDER, LYOPHILIZED, FOR SOLUTION INTRAVENOUS EVERY 6 HOURS
Status: DISCONTINUED | OUTPATIENT
Start: 2021-08-12 | End: 2021-08-12

## 2021-08-12 RX ORDER — ACETAMINOPHEN 325 MG/1
975 TABLET ORAL EVERY 8 HOURS
Status: CANCELLED | OUTPATIENT
Start: 2021-08-12

## 2021-08-12 RX ORDER — ONDANSETRON 4 MG/1
4 TABLET, ORALLY DISINTEGRATING ORAL EVERY 6 HOURS PRN
Status: DISCONTINUED | OUTPATIENT
Start: 2021-08-12 | End: 2021-08-13 | Stop reason: HOSPADM

## 2021-08-12 RX ORDER — PIPERACILLIN SODIUM, TAZOBACTAM SODIUM 3; .375 G/15ML; G/15ML
3.38 INJECTION, POWDER, LYOPHILIZED, FOR SOLUTION INTRAVENOUS ONCE
Status: COMPLETED | OUTPATIENT
Start: 2021-08-12 | End: 2021-08-12

## 2021-08-12 RX ORDER — IOPAMIDOL 755 MG/ML
100 INJECTION, SOLUTION INTRAVASCULAR ONCE
Status: COMPLETED | OUTPATIENT
Start: 2021-08-12 | End: 2021-08-12

## 2021-08-12 RX ORDER — NICOTINE 21 MG/24HR
1 PATCH, TRANSDERMAL 24 HOURS TRANSDERMAL DAILY PRN
Status: DISCONTINUED | OUTPATIENT
Start: 2021-08-12 | End: 2021-08-13 | Stop reason: HOSPADM

## 2021-08-12 RX ORDER — MORPHINE SULFATE 4 MG/ML
4 INJECTION, SOLUTION INTRAMUSCULAR; INTRAVENOUS ONCE
Status: COMPLETED | OUTPATIENT
Start: 2021-08-12 | End: 2021-08-12

## 2021-08-12 RX ORDER — NALOXONE HYDROCHLORIDE 0.4 MG/ML
0.2 INJECTION, SOLUTION INTRAMUSCULAR; INTRAVENOUS; SUBCUTANEOUS
Status: DISCONTINUED | OUTPATIENT
Start: 2021-08-12 | End: 2021-08-13 | Stop reason: HOSPADM

## 2021-08-12 RX ORDER — PIPERACILLIN SODIUM, TAZOBACTAM SODIUM 3; .375 G/15ML; G/15ML
3.38 INJECTION, POWDER, LYOPHILIZED, FOR SOLUTION INTRAVENOUS EVERY 8 HOURS
Status: DISCONTINUED | OUTPATIENT
Start: 2021-08-12 | End: 2021-08-13 | Stop reason: HOSPADM

## 2021-08-12 RX ADMIN — PIPERACILLIN SODIUM AND TAZOBACTAM SODIUM 3.38 G: 3; .375 INJECTION, POWDER, LYOPHILIZED, FOR SOLUTION INTRAVENOUS at 17:45

## 2021-08-12 RX ADMIN — PIPERACILLIN SODIUM AND TAZOBACTAM SODIUM 3.38 G: 3; .375 INJECTION, POWDER, LYOPHILIZED, FOR SOLUTION INTRAVENOUS at 12:29

## 2021-08-12 RX ADMIN — DEXTROSE AND SODIUM CHLORIDE: 5; 900 INJECTION, SOLUTION INTRAVENOUS at 16:13

## 2021-08-12 RX ADMIN — IOPAMIDOL 100 ML: 755 INJECTION, SOLUTION INTRAVENOUS at 10:19

## 2021-08-12 RX ADMIN — MORPHINE SULFATE 4 MG: 4 INJECTION, SOLUTION INTRAMUSCULAR; INTRAVENOUS at 13:06

## 2021-08-12 ASSESSMENT — ENCOUNTER SYMPTOMS
DIARRHEA: 0
SHORTNESS OF BREATH: 0
NAUSEA: 0
HEMATURIA: 0
FEVER: 0
FLANK PAIN: 0
ABDOMINAL PAIN: 1
DIAPHORESIS: 0
VOMITING: 0
CONSTIPATION: 0
DYSURIA: 0
DIFFICULTY URINATING: 0
BLOOD IN STOOL: 0

## 2021-08-12 ASSESSMENT — MIFFLIN-ST. JEOR
SCORE: 1813.96
SCORE: 1727.33

## 2021-08-12 ASSESSMENT — ACTIVITIES OF DAILY LIVING (ADL): DEPENDENT_IADLS:: INDEPENDENT

## 2021-08-12 NOTE — ED PROVIDER NOTES
Emergency Department Encounter     Evaluation Date & Time:   2021 11:17 AM    CHIEF COMPLAINT:  Abdominal Pain      Triage Note:Pt reporrts hx of diverticulitis 6 mos ago, pt reports 4 days of abd pain, reports dark stool x 1 and trace blood on toilet paper.        Impression and Plan     ED COURSE & MEDICAL DECISION MAKIN:22 AM I spoke with Dr. Huff. He recommends starting IV antibiotics and agrees with the plan for admission. He will see the patient in the hospital.   11:25 AM I met with the patient to gather history and perform an initial exam. PPE: gloves, N95 mask, surgical cap, and eye protection.  11:30 AM I discussed the plan for admission with the patient and he is agreeable to this.  11:50 AM I spoke with [facility] resident. They accept the patient for admission. Patient will board here in the ED until a bed is available.     36-year-old male presenting for evaluation of abdominal pain.  Patient has a history of diverticulitis.  States that the pain been ongoing over the last 4 days.  Patient did have fevers last night.  On examination does have diffuse tenderness.  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  Given evidence of the fistula patient started on IV antibiotics, surgery consulted and patient to be admitted to the hospital.    At the conclusion of the encounter I discussed the results of all the tests and the disposition. The questions were answered. The patient or family acknowledged understanding and was agreeable with the care plan.    FINAL IMPRESSION:    ICD-10-CM    1. Diverticulitis of colon  K57.32    2. 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        0 minutes of critical care time    Reassessments & Consults       MEDICATIONS GIVEN IN THE EMERGENCY DEPARTMENT:  Medications   iopamidol (ISOVUE-370) solution 100 mL (100 mLs Intravenous Given 21 1019)   piperacillin-tazobactam  (ZOSYN) 3.375 g vial to attach to  mL bag (0 g Intravenous Stopped 8/12/21 1303)   morphine (PF) injection 4 mg (4 mg Intravenous Given 8/12/21 1306)       NEW PRESCRIPTIONS STARTED AT TODAY'S ED VISIT:  New Prescriptions    No medications on file       HPI     HPI     Nathaniel Ybarra is a 36 year old male with a pertinent history of diverticulitis and colonic fisstula who presents to this ED via walk-in for evaluation of abdominal pain.     Per chart review,  Patient was seen in the ED 2/13/2021 for evaluation of abdominal pain. CT confirmed patient had diverticulitis without abscess or perforation. Patient discharged home with Augmentin.     Patient reports diffuse abdominal pain that began Monday (8/9/21). He also endorses fevers and sweats throughout the night last night, but states they have resolved. Patient denies any abdominal pain, nausea, vomiting, diarrhea, or other symptoms or concerns at this time. NKDA.    REVIEW OF SYSTEMS:  Review of Systems   Constitutional: Negative for diaphoresis (resolved) and fever (resolved).   HENT: Negative for congestion.    Respiratory: Negative for shortness of breath.    Cardiovascular: Negative for chest pain.   Gastrointestinal: Positive for abdominal pain (diffuse). Negative for blood in stool, constipation, diarrhea, nausea and vomiting.   Genitourinary: Negative for difficulty urinating, dysuria, flank pain and hematuria.   All other systems reviewed and are negative.        Medical History     Past Medical History:   Diagnosis Date     NO ACTIVE PROBLEMS        Past Surgical History:   Procedure Laterality Date     NO HISTORY OF SURGERY         No family history on file.    Social History     Tobacco Use     Smoking status: Not on file   Substance Use Topics     Alcohol use: No     Alcohol/week: 0.0 standard drinks     Drug use: No       No current outpatient medications on file.      Physical Exam     First Vitals:  Patient Vitals for the past 24 hrs:   BP  "Temp Temp src Pulse Resp SpO2 Height Weight   08/12/21 1300 123/82 -- -- 64 -- 98 % -- --   08/12/21 1245 124/87 -- -- 66 -- 96 % -- --   08/12/21 1230 (!) 134/92 -- -- 65 -- 98 % -- --   08/12/21 1215 (!) 135/96 -- -- 69 -- 95 % -- --   08/12/21 1200 (!) 144/105 -- -- 75 -- 97 % -- --   08/12/21 1145 (!) 131/100 -- -- 76 -- 97 % -- --   08/12/21 1030 (!) 165/101 -- -- 81 16 95 % -- --   08/12/21 0831 (!) 138/98 98.4  F (36.9  C) Temporal 82 18 97 % 1.803 m (5' 11\") 86.2 kg (190 lb)       PHYSICAL EXAM:   Physical Exam  Vitals and nursing note reviewed.   Constitutional:       General: He is not in acute distress.     Appearance: Normal appearance. He is not ill-appearing.   HENT:      Head: Normocephalic and atraumatic.      Right Ear: External ear normal.      Left Ear: External ear normal.      Nose: Nose normal.      Mouth/Throat:      Mouth: Mucous membranes are moist.   Eyes:      Extraocular Movements: Extraocular movements intact.      Pupils: Pupils are equal, round, and reactive to light.   Cardiovascular:      Rate and Rhythm: Normal rate and regular rhythm.   Pulmonary:      Effort: Pulmonary effort is normal.      Breath sounds: Normal breath sounds.   Abdominal:      General: Abdomen is flat. There is no distension.      Palpations: There is no mass.      Tenderness: There is abdominal tenderness (diffuse). There is no guarding or rebound.      Hernia: No hernia is present.   Musculoskeletal:         General: No swelling, tenderness or signs of injury. Normal range of motion.      Cervical back: Normal range of motion.   Skin:     General: Skin is warm.      Capillary Refill: Capillary refill takes less than 2 seconds.   Neurological:      General: No focal deficit present.      Mental Status: He is alert and oriented to person, place, and time. Mental status is at baseline.      Cranial Nerves: No cranial nerve deficit.      Motor: No weakness.      Coordination: Coordination normal.   Psychiatric:    "      Mood and Affect: Mood normal.         Results     LAB:  All pertinent labs reviewed and interpreted  Labs Ordered and Resulted from Time of ED Arrival Up to the Time of Departure from the ED   ROUTINE UA WITH MICROSCOPIC REFLEX TO CULTURE - Abnormal; Notable for the following components:       Result Value    Specific Gravity Urine 1.032 (*)     Protein Albumin Urine 30  (*)     Urobilinogen Urine 2.0 (*)     Mucus Urine Present (*)     All other components within normal limits    Narrative:     Urine Culture not indicated   BASIC METABOLIC PANEL - Abnormal; Notable for the following components:    Chloride 110 (*)     Urea Nitrogen 7 (*)     All other components within normal limits   HEPATIC FUNCTION PANEL - Abnormal; Notable for the following components:    Bilirubin Total 1.1 (*)     Alkaline Phosphatase 122 (*)     All other components within normal limits   CBC WITH PLATELETS AND DIFFERENTIAL - Abnormal; Notable for the following components:    WBC Count 12.8 (*)     Absolute Neutrophils 9.6 (*)     Absolute Immature Granulocytes 0.1 (*)     All other components within normal limits   LIPASE - Normal   PARTIAL THROMBOPLASTIN TIME - Normal   INR - Normal   CBC WITH PLATELETS & DIFFERENTIAL    Narrative:     The following orders were created for panel order CBC with platelets + differential.  Procedure                               Abnormality         Status                     ---------                               -----------         ------                     CBC with platelets and d...[[id number]]  Abnormal            Final result                 Please view results for these tests on the individual orders.   SARS-COV2 (COVID-19) VIRUS RT-PCR   PERIPHERAL IV CATHETER   CALL   CALL   BLOOD CULTURE   COVID-19 VIRUS (CORONAVIRUS) BY PCR    Narrative:     The following orders were created for panel order Asymptomatic COVID-19 Virus (Coronavirus) by PCR.  Procedure                               Abnormality          Status                     ---------                               -----------         ------                     SARS-COV2 (COVID-19) Vir...[[id number]]                                                   Please view results for these tests on the individual orders.       RADIOLOGY:  CT Abdomen Pelvis w Contrast   Final Result   IMPRESSION:    1.  Acute diverticulitis mid sigmoid colon with small amount of extraluminal gas and no discrete abscess.   2.  Probable fistula between mid sigmoid colon and distal small bowel right lower quadrant, compatible with some degree of chronic inflammation.                   PROCEDURES:  Procedures:      [facility] Documentation       Cristina Ku  Emergency Medicine  [facility] EMERGENCY DEPARTMENT       Al Moore DO  08/12/21 9891

## 2021-08-12 NOTE — CONSULTS
"General Surgery Consultation  Nathaniel Ybarra MRN# [id number]   Age/Sex: 36 year old male YOB: 1985     Reason for consult: No diagnosis found.        Requesting physician: Dr. 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 We should let the inflammation settle down with antibiotics and bowel rest. If he responds well to the antibiotics after 24-48 hours, he can go home on cipro/flagyl to complete a two week course and see the surgeon in 3 weeks to talk about sigmoidectomy. If he worsens clinically, we will need to revise this plan.    Plan:  - NPO except sips with meds  - IV abx   - repeat labs in the morning  - we will follow          Chief Complaint:     Chief Complaint   Patient presents with     Abdominal Pain        History is obtained from the patient    HPI:   Nathaniel Ybarra is a 36 year old male who presents to the ED with abdominal pain. PMH significant for diverticulitis 6 months ago and he is a current everyday smoker. He reports that pain came on after dinner on Monday and has continued to worsen since then. Nothing helps the pain. Eating makes pain worse. Patient reports a fever of over 101 at home last night. He is having bowel movements and has been \"going a lot\". He noted that his stool is darker than usual and he noted some bright red blood on the toilet paper that he attributed to \"giong to the bathroom so much\". He has also noted his urine being darker (almost brown) at times lately. He denies nausea, vomiting, SOB, CP, new cough (states he has an occasional dry \"smoker's cough\"), light headedness, constipation, diarrhea, rashes or lesions. No previous colonoscopy.           Past Medical History:     Past Medical History:   Diagnosis Date     NO ACTIVE PROBLEMS               Past Surgical History:     Past Surgical History:   Procedure Laterality Date     NO HISTORY OF SURGERY               " "Social History:    reports that he does not drink alcohol and does not use drugs.           Family History:   No family history on file.           Allergies:   No Known Allergies           Medications:     Prior to Admission medications    Not on File              Review of Systems:   The Review of Systems is negative other than noted in the HPI            Physical Exam:     Patient Vitals for the past 24 hrs:   BP Temp Temp src Pulse Resp SpO2 Height Weight   08/12/21 1030 (!) 165/101 -- -- 81 16 95 % -- --   08/12/21 0831 (!) 138/98 98.4  F (36.9  C) Temporal 82 18 97 % 1.803 m (5' 11\") 86.2 kg (190 lb)        No intake or output data in the 24 hours ending 08/12/21 1149   Constitutional:   awake, alert, cooperative, no apparent distress, and appears stated age       Eyes:   PERRL, conjunctiva/corneas clear, EOM's intact; no scleral edema or icterus noted        ENT:   Normocephalic, without obvious abnormality, atraumatic, Lips, mucosa, and tongue normal        Hematologic / Lymphatic:   No lymphadenopathy noted       Lungs:   Normal respiratory effort, no accessory muscle use       Cardiovascular:   Regular rate and rhythm       Abdomen:   Soft, not distended, tender to palpation in the LLQ and exquisite tenderness in suprapubic region, no rebound tenderness or involuntary guarding noted       Musculoskeletal:   No obvious swelling, bruising or deformity       Skin:   Skin color and texture normal for patient, no rashes or lesions              Data:         All imaging studies reviewed by me and discussed with Dr. Jonathan Rosales    Results for orders placed or performed during the hospital encounter of 08/12/21 (from the past 24 hour(s))   CBC with platelets + differential    Narrative    The following orders were created for panel order CBC with platelets + differential.  Procedure                               Abnormality         Status                     ---------                               -----------    "      ------                     CBC with platelets and d...[[id number]]  Abnormal            Final result                 Please view results for these tests on the individual orders.   Basic metabolic panel   Result Value Ref Range    Sodium 141 136 - 145 mmol/L    Potassium 3.7 3.5 - 5.0 mmol/L    Chloride 110 (H) 98 - 107 mmol/L    Carbon Dioxide (CO2) 23 22 - 31 mmol/L    Anion Gap 8 5 - 18 mmol/L    Urea Nitrogen 7 (L) 8 - 22 mg/dL    Creatinine 0.86 0.70 - 1.30 mg/dL    Calcium 9.4 8.5 - 10.5 mg/dL    Glucose 114 70 - 125 mg/dL    GFR Estimate >90 >60 mL/min/1.73m2   Hepatic function panel   Result Value Ref Range    Bilirubin Total 1.1 (H) 0.0 - 1.0 mg/dL    Bilirubin Direct 0.4 <=0.5 mg/dL    Protein Total 7.8 6.0 - 8.0 g/dL    Albumin 3.6 3.5 - 5.0 g/dL    Alkaline Phosphatase 122 (H) 45 - 120 U/L    AST 22 0 - 40 U/L    ALT 29 0 - 45 U/L   Lipase   Result Value Ref Range    Lipase <9 0 - 52 U/L   PTT   Result Value Ref Range    aPTT 36 22 - 38 Seconds   INR   Result Value Ref Range    INR 1.00 0.85 - 1.15   CBC with platelets and differential   Result Value Ref Range    WBC Count 12.8 (H) 4.0 - 11.0 10e3/uL    RBC Count 5.38 4.40 - 5.90 10e6/uL    Hemoglobin 16.5 13.3 - 17.7 g/dL    Hematocrit 48.4 40.0 - 53.0 %    MCV 90 78 - 100 fL    MCH 30.7 26.5 - 33.0 pg    MCHC 34.1 31.5 - 36.5 g/dL    RDW 12.7 10.0 - 15.0 %    Platelet Count 191 150 - 450 10e3/uL    % Neutrophils 75 %    % Lymphocytes 14 %    % Monocytes 9 %    % Eosinophils 2 %    % Basophils 0 %    % Immature Granulocytes 0 %    NRBCs per 100 WBC 0 <1 /100    Absolute Neutrophils 9.6 (H) 1.6 - 8.3 10e3/uL    Absolute Lymphocytes 1.7 0.8 - 5.3 10e3/uL    Absolute Monocytes 1.1 0.0 - 1.3 10e3/uL    Absolute Eosinophils 0.3 0.0 - 0.7 10e3/uL    Absolute Basophils 0.0 0.0 - 0.2 10e3/uL    Absolute Immature Granulocytes 0.1 (H) <=0.0 10e3/uL    Absolute NRBCs 0.0 10e3/uL   UA with Microscopic reflex to Culture    Specimen: Urine, Midstream   Result  Value Ref Range    Color Urine Yellow Colorless, Straw, Light Yellow, Yellow    Appearance Urine Clear Clear    Glucose Urine Negative Negative mg/dL    Bilirubin Urine Negative Negative    Ketones Urine Negative Negative mg/dL    Specific Gravity Urine 1.032 (H) 1.001 - 1.030    Blood Urine Negative Negative    pH Urine 6.0 5.0 - 7.0    Protein Albumin Urine 30  (A) Negative mg/dL    Urobilinogen Urine 2.0 (A) <2.0 mg/dL    Nitrite Urine Negative Negative    Leukocyte Esterase Urine Negative Negative    Mucus Urine Present (A) None Seen /LPF    RBC Urine 1 <=2 /HPF    WBC Urine 1 <=5 /HPF    Narrative    Urine Culture not indicated   Asymptomatic COVID-19 Virus (Coronavirus) by PCR    Specimen: Swab    Narrative    The following orders were created for panel order Asymptomatic COVID-19 Virus (Coronavirus) by PCR.  Procedure                               Abnormality         Status                     ---------                               -----------         ------                     SARS-COV2 (COVID-19) Vir...[[id number]]                                                   Please view results for these tests on the individual orders.        Keli Ceorn, APRN CNP

## 2021-08-12 NOTE — ED NOTES
"[facility] ED Handoff Report    ED Chief Complaint:  chief complaint    ED Diagnosis:  (K57.32) Diverticulitis of colon  Comment: Diverticulitis with fistula.  Pt not feeling well since Monday.  Worse with fever and pain since last night.  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  Comment:   Plan:        PMH:    Past Medical History:   Diagnosis Date     NO ACTIVE PROBLEMS         Code Status:  No Order     Falls Risk: No    Current Living Situation/Residence: Home       Elimination Status:  continent    Activity Level:  independent    Patients Preferred Language:  English     Needed: No    Infection:  [unfilled]     Vital Signs:  /70   Pulse 69   Temp 98.4  F (36.9  C) (Temporal)   Resp 16   Ht 1.803 m (5' 11\")   Wt 86.2 kg (190 lb)   SpO2 97%   BMI 26.50 kg/m       Cardiac Rhythm: n/a    Pain Score:  0/10    Is the Patient Confused:  No    Last Food or Drink: today    Focused Assessment:  Low abd pain with palpation    Tests Performed:  X ray and lab    Abnormal Results:    Abnormal Labs Reviewed   ROUTINE UA WITH MICROSCOPIC REFLEX TO CULTURE - Abnormal; Notable for the following components:       Result Value    Specific Gravity Urine 1.032 (*)     Protein Albumin Urine 30  (*)     Urobilinogen Urine 2.0 (*)     Mucus Urine Present (*)     All other components within normal limits    Narrative:     Urine Culture not indicated   BASIC METABOLIC PANEL - Abnormal; Notable for the following components:    Chloride 110 (*)     Urea Nitrogen 7 (*)     All other components within normal limits   HEPATIC FUNCTION PANEL - Abnormal; Notable for the following components:    Bilirubin Total 1.1 (*)     Alkaline Phosphatase 122 (*)     All other components within normal limits   CBC WITH PLATELETS AND DIFFERENTIAL - Abnormal; Notable for the following components:    WBC Count 12.8 (*)     Absolute Neutrophils 9.6 (*)     Absolute Immature Granulocytes 0.1 (*)     All " other components within normal limits       CT Abdomen Pelvis w Contrast   Final Result   IMPRESSION:    1.  Acute diverticulitis mid sigmoid colon with small amount of extraluminal gas and no discrete abscess.   2.  Probable fistula between mid sigmoid colon and distal small bowel right lower quadrant, compatible with some degree of chronic inflammation.           Treatments Provided:  Pain med and antibiotic    Family Dynamics/Concerns: no    Family Updated On Visitor Policy: No    Plan of Care Communicated to Family: No    Who Was Updated about Plan of Care: Pt    Belongings Checklist Done and Signed by Patient: Yes    ED Medications:    Medications   iopamidol (ISOVUE-370) solution 100 mL (100 mLs Intravenous Given 8/12/21 1019)   piperacillin-tazobactam (ZOSYN) 3.375 g vial to attach to  mL bag (0 g Intravenous Stopped 8/12/21 1303)   morphine (PF) injection 4 mg (4 mg Intravenous Given 8/12/21 1306)        Additional Information:     @ME2@ 8/12/2021 2:21 PM

## 2021-08-12 NOTE — CONSULTS
"Care Management Initial Consult    General Information  Assessment completed with: Patient, Nathaniel via i pad  Type of CM/SW Visit: Initial Assessment    Primary Care Provider verified and updated as needed: No (\"no PMD\")   Readmission within the last 30 days: no previous admission in last 30 days      Reason for Consult: discharge planning  Advance Care Planning: Advance Care Planning Reviewed: other (comment) (\"don't have one\")          Communication Assessment  Patient's communication style: spoken language (English or Bilingual)             Cognitive  Cognitive/Neuro/Behavioral: WDL                      Living Environment:   People in home: significant other     Current living Arrangements: apartment      Able to return to prior arrangements: yes       Family/Social Support:  Care provided by: self  Provides care for: no one     Significant Other          Description of Support System:           Current Resources:   Patient receiving home care services: No     Community Resources: None  Equipment currently used at home: none  Supplies currently used at home: None    Employment/Financial:  Employment Status:          Financial Concerns:             Lifestyle & Psychosocial Needs:  Social Determinants of Health     Tobacco Use:      Smoking Tobacco Use:      Smokeless Tobacco Use:    Alcohol Use:      Frequency of Alcohol Consumption:      Average Number of Drinks:      Frequency of Binge Drinking:    Financial Resource Strain:      Difficulty of Paying Living Expenses:    Food Insecurity:      Worried About Running Out of Food in the Last Year:      Ran Out of Food in the Last Year:    Transportation Needs:      Lack of Transportation (Medical):      Lack of Transportation (Non-Medical):    Physical Activity:      Days of Exercise per Week:      Minutes of Exercise per Session:    Stress:      Feeling of Stress :    Social Connections:      Frequency of Communication with Friends and Family:      Frequency of Social " Gatherings with Friends and Family:      Attends Confucianist Services:      Active Member of Clubs or Organizations:      Attends Club or Organization Meetings:      Marital Status:    Intimate Partner Violence:      Fear of Current or Ex-Partner:      Emotionally Abused:      Physically Abused:      Sexually Abused:    Depression:      PHQ-2 Score:    Housing Stability:      Unable to Pay for Housing in the Last Year:      Number of Places Lived in the Last Year:      Unstable Housing in the Last Year:        Functional Status:  Prior to admission patient needed assistance:   Dependent ADLs:: Independent  Dependent IADLs:: Independent       Mental Health Status:          Chemical Dependency Status:                Values/Beliefs:  Spiritual, Cultural Beliefs, Confucianist Practices, Values that affect care:                 Additional Information:  Nathaniel lives in an apartment with his significant other. He is independent with ADLs at baseline. Likely no discharge needs at this time.    Danielle Melvin RN

## 2021-08-12 NOTE — ED TRIAGE NOTES
Pt reporrts hx of diverticulitis 6 mos ago, pt reports 4 days of abd pain, reports dark stool x 1 and trace blood on toilet paper.

## 2021-08-12 NOTE — PHARMACY-ADMISSION MEDICATION HISTORY
Pharmacy Note - Admission Medication History    Pertinent Provider Information: None     ______________________________________________________________________    Prior To Admission (PTA) med list completed and updated in EMR.       No outpatient medications have been marked as taking for the 8/12/21 encounter (Hospital Encounter).       Information source(s): Patient and CareEverywhere/[facility]  Method of interview communication: in-person    Summary of Changes to PTA Med List  New: none  Discontinued: none  Changed: none    Patient was asked about OTC/herbal products specifically.  PTA med list reflects this.    In the past week, patient estimated taking medication this percent of the time:  n/a.    Allergies were reviewed, assessed, and updated with the patient.      The information provided in this note is only as accurate as the sources available at the time of the update(s).    Thank you for the opportunity to participate in the care of this patient.    Humberto Watkins RPH  8/12/2021 1:49 PM

## 2021-08-12 NOTE — PROGRESS NOTES
Patient seen by surgery and planning outpatient surgical procedure in about 3 weeks.  Medical management.  Will allow patient to eat and saline lock IV.  Denies n/vd and no abdominal pain.

## 2021-08-12 NOTE — ED NOTES
Pt states not feeling well since Monday.  Pt states pain since Monday and started a fever last light and feeling tired.  GI here to see Pt.

## 2021-08-12 NOTE — TELEPHONE ENCOUNTER
Yesterday HP having abd pain, diverticulitis.  Pooped black.    Needs to be seen in ER.    Ira Carmona RN  [facility] Home Nurse Advisor      Reason for Disposition    SEVERE abdominal pain (e.g., excruciating)    Bloody, black, or tarry bowel movements (Exception: chronic-unchanged black-grey bowel movements and is taking iron pills or Pepto-bismol)    Additional Information    Negative: Passed out (i.e., fainted, collapsed and was not responding)    Negative: Shock suspected (e.g., cold/pale/clammy skin, too weak to stand, low BP, rapid pulse)    Negative: Sounds like a life-threatening emergency to the triager    Negative: Chest pain    Negative: Pain is mainly in upper abdomen (if needed ask: 'is it mainly above the belly button?')    Protocols used: ABDOMINAL PAIN - MALE-A-OH

## 2021-08-13 VITALS
HEART RATE: 77 BPM | RESPIRATION RATE: 18 BRPM | DIASTOLIC BLOOD PRESSURE: 82 MMHG | TEMPERATURE: 98.5 F | WEIGHT: 188.4 LBS | OXYGEN SATURATION: 96 % | BODY MASS INDEX: 30.28 KG/M2 | HEIGHT: 66 IN | SYSTOLIC BLOOD PRESSURE: 121 MMHG

## 2021-08-13 LAB
ALBUMIN SERPL-MCNC: 3.3 G/DL (ref 3.5–5)
ALP SERPL-CCNC: 123 U/L (ref 45–120)
ALT SERPL W P-5'-P-CCNC: 30 U/L (ref 0–45)
ANION GAP SERPL CALCULATED.3IONS-SCNC: 11 MMOL/L (ref 5–18)
AST SERPL W P-5'-P-CCNC: 21 U/L (ref 0–40)
BILIRUB SERPL-MCNC: 1.3 MG/DL (ref 0–1)
BUN SERPL-MCNC: 6 MG/DL (ref 8–22)
CALCIUM SERPL-MCNC: 9.3 MG/DL (ref 8.5–10.5)
CHLORIDE BLD-SCNC: 107 MMOL/L (ref 98–107)
CO2 SERPL-SCNC: 23 MMOL/L (ref 22–31)
CREAT SERPL-MCNC: 0.82 MG/DL (ref 0.7–1.3)
ERYTHROCYTE [DISTWIDTH] IN BLOOD BY AUTOMATED COUNT: 12.6 % (ref 10–15)
GFR SERPL CREATININE-BSD FRML MDRD: >90 ML/MIN/1.73M2
GLUCOSE BLD-MCNC: 100 MG/DL (ref 70–125)
HCT VFR BLD AUTO: 46.1 % (ref 40–53)
HGB BLD-MCNC: 15.9 G/DL (ref 13.3–17.7)
MCH RBC QN AUTO: 31.2 PG (ref 26.5–33)
MCHC RBC AUTO-ENTMCNC: 34.5 G/DL (ref 31.5–36.5)
MCV RBC AUTO: 90 FL (ref 78–100)
PLATELET # BLD AUTO: 193 10E3/UL (ref 150–450)
POTASSIUM BLD-SCNC: 3.8 MMOL/L (ref 3.5–5)
PROT SERPL-MCNC: 7.4 G/DL (ref 6–8)
RBC # BLD AUTO: 5.1 10E6/UL (ref 4.4–5.9)
SODIUM SERPL-SCNC: 141 MMOL/L (ref 136–145)
WBC # BLD AUTO: 9.6 10E3/UL (ref 4–11)

## 2021-08-13 PROCEDURE — 99231 SBSQ HOSP IP/OBS SF/LOW 25: CPT | Performed by: NURSE PRACTITIONER

## 2021-08-13 PROCEDURE — 85027 COMPLETE CBC AUTOMATED: CPT | Performed by: FAMILY MEDICINE

## 2021-08-13 PROCEDURE — 36415 COLL VENOUS BLD VENIPUNCTURE: CPT | Performed by: FAMILY MEDICINE

## 2021-08-13 PROCEDURE — 250N000011 HC RX IP 250 OP 636: Performed by: FAMILY MEDICINE

## 2021-08-13 PROCEDURE — 99238 HOSP IP/OBS DSCHRG MGMT 30/<: CPT | Mod: GC | Performed by: STUDENT IN AN ORGANIZED HEALTH CARE EDUCATION/TRAINING PROGRAM

## 2021-08-13 PROCEDURE — 80053 COMPREHEN METABOLIC PANEL: CPT | Performed by: FAMILY MEDICINE

## 2021-08-13 RX ORDER — CIPROFLOXACIN 500 MG/1
500 TABLET, FILM COATED ORAL 2 TIMES DAILY
Qty: 28 TABLET | Refills: 0 | Status: SHIPPED | OUTPATIENT
Start: 2021-08-13 | End: 2021-08-27

## 2021-08-13 RX ORDER — METRONIDAZOLE 250 MG/1
500 TABLET ORAL 3 TIMES DAILY
Qty: 84 TABLET | Refills: 0 | Status: SHIPPED | OUTPATIENT
Start: 2021-08-13 | End: 2021-08-27

## 2021-08-13 RX ADMIN — PIPERACILLIN SODIUM AND TAZOBACTAM SODIUM 3.38 G: 3; .375 INJECTION, POWDER, LYOPHILIZED, FOR SOLUTION INTRAVENOUS at 02:31

## 2021-08-13 NOTE — UTILIZATION REVIEW
Admission Status; Secondary Review Determination       As part of the [place] Utilization review plan, a self-audit is done on Medicare inpatient admission with less than 2 midnights stay. The 2014 IPPS Final Rule allows outpatient billing in the event that a hospital determines that an inpatient admission was not medically necessary under utilization review process.          (x) Outpatient status would be Appropriate- Short Stay- Post discharge review.     RATIONALE FOR DETERMINATION   36 yr old male presented with abdominal pain.  Acute diverticulitis.  No sepsis, no abscess, no perforation.  Discharged after one night.  Does not meet inpatient criteria.    This account and medical record number for a Medicare beneficiary was an inpatient short stay (<2 midnights) and didn't meet medical necessity for inpatient admission. We recommend billing outpatient services based on the severity of illness, intensity of service provided and the inpatient length of stay.  Please contact me within one week of receiving this letter only if you disagree with this determination. If you concur, no further action is needed.          The information on this document is developed by the utilization review team in order for the business office to ensure compliance.  This only denotes the appropriateness of proper admission status and does not reflect the quality of care rendered.         The definitions of Inpatient Status and Observation Status used in making the determination above are those provided in the CMS Coverage Manual, Chapter 1 and Chapter 6, section 70.4.      Sincerely,     Val Montes MD  Utilization  Management  [facility].

## 2021-08-13 NOTE — H&P
[facility]    History and Physical - [facility]        Date of Admission:  8/12/2021    Assessment & Plan      Nathaniel Ybarra is a 36 year old male admitted on 8/12/2021. He has a history of diverticulosis and elevated blood pressure, presenting with 4 days of severe abdominal pain and associated objective fevers and nausea, found to have acute diverticulitis of the mid sigmoid colon complicated by probable fistula.    Acute Diverticulitis c/b probable fistula   Patient with 4 days of progressive, sharp lower quadrant abdominal pain with associated fevers and nausea.  Vital signs on arrival notable for elevated blood pressure up to 165/101.  Labs notable for leukocytosis of 12.8.  CT of abdomen reveals acute diverticulitis of the mid sigmoid colon, small amount of extraluminal gas, no abscess, and a probable fistula connecting the mid sigmoid colon to the distal small bowel with some chronic inflammation.  General surgery was notified and recommended admission with IV antibiotics. Plan for outpatient clinic follow-up with general surgery.  -IV Zosyn   -pain control: tylenol scheduled, IV dilaudid prn  -follow-up outpatient with general surgery    Elevated Blood Pressure  Patient noted to have pressure up to 165/101, then 140s over 90s distantly after.  Asymptomatic. Patient reports that he knows he has been told he has high blood pressure. He has not pursued treatment at this point; however, he is interested in getting his blood pressure under control so he may healthy for his family.  He does not have a primary care provider.  -monitor  -set up follow-up outpatient appointment for elevated blood pressure        Diet: Regular Diet Adult    DVT Prophylaxis: Pneumatic Compression Devices  Romero Catheter: Not present  Fluids: None while eating  Central Lines: None  Code Status: Full Code      Disposition Plan   Expected discharge: 08/14/2021 recommended to prior living arrangement once  adequate pain management/ tolerating PO medications.       Patient discussed with attending, Dr. Basurto.    Lesly Campbell, PGY1  [facility]   Pager #: [phone]  _______________________________________________________________    Chief Complaint   Abdominal Pain    History is obtained from the patient    History of Present Illness   Nathaniel Ybarra is a 36 year old male with history significant for diverticulosis who presents with progressive abdominal pain for the past 4 days. He describes it has a consistent dull pain over the lower quadrants of his abdomen with intermittent sharp pain. Associated with nausea but no emesis. Lying still makes it better, movement makes it worse. No association with food intake. States the pain was 10 out of 10 last night and prompted him to come today. His last BM was this morning, and is passing gas. He reports subjective fevers and chills last night. No dizziness or lightheadedness, vision change, chest pain, shortness of breath, emesis, melena, or hematochezia.    Review of Systems    The 12 point Review of Systems is negative other than noted in the HPI or here.     Past Medical History    I have reviewed this patient's medical history and updated it with pertinent information if needed.   Past Medical History:   Diagnosis Date     NO ACTIVE PROBLEMS      Past Surgical History   I have reviewed this patient's surgical history and updated it with pertinent information if needed.  Past Surgical History:   Procedure Laterality Date     NO HISTORY OF SURGERY        Social History   I have reviewed this patient's social history and updated it with pertinent information if needed. Patient smokes about 1 ppd of tobacco since he was 13. 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 Does smoke mariajuana. Lives in [place] in an apartment with his partner and two kids. 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     Family History   pGF with CAD, hx CVA;  mother has DM    Prior to Admission Medications   None     Allergies   No Known Allergies    Physical Exam   Vital Signs: Temp: 97.9  F (36.6  C) Temp src: Oral BP: (!) 143/90 Pulse: 71   Resp: 18 SpO2: 97 % O2 Device: None (Room air)    Weight: 188 lbs 6.4 oz    Constitutional: awake, alert and cooperative, non-toxic but uncomfortable appearing  Eyes: Lids and lashes normal, pupils equal, round and reactive to light, sclera clear, conjunctiva normal  ENT: normocepalic, without obvious abnormality  Hematologic / Lymphatic: no cervical lymphadenopathy  Respiratory: clear to auscultation bilaterally, no crackles or wheezing  Cardiovascular: Normal apical impulse, regular rate and rhythm, no murmurs  GI: no scars, normal bowel sounds, non-distended, tenderness noted in the right lower quadrant and in the left lower quadrant, mild involuntary guarding present and no masses palpated  Skin: normal skin color, texture, turgor  Musculoskeletal: no lower extremity pitting edema present  Neurologic: Awake, alert.  Cranial nerves II-XII are grossly intact.    Neuropsychiatric: General: normal and normal eye contact    Data   Data reviewed today: I reviewed all medications, new labs and imaging results over the last 24 hours. I personally reviewed the abdominal CT image(s) showing acute diverticulitis of the mid sigmoid colon, small amount of extraluminal gas, probable fistula from mid sigmoid colon to distal small bowel.    Recent Labs   Lab 08/12/21  0907   WBC 12.8*   HGB 16.5   MCV 90      INR 1.00      POTASSIUM 3.7   CHLORIDE 110*   CO2 23   BUN 7*   CR 0.86   ANIONGAP 8   FRANCHESCA 9.4      ALBUMIN 3.6   PROTTOTAL 7.8   BILITOTAL 1.1*   ALKPHOS 122*   ALT 29   AST 22   LIPASE <9     Recent Results (from the past 24 hour(s))   CT Abdomen Pelvis w Contrast    Narrative    EXAM: CT ABDOMEN PELVIS W CONTRAST  LOCATION: [facility]  DATE/TIME: 8/12/2021 10:17 AM    INDICATION: Lower  abdominal pain. Previous history of diverticulitis.  COMPARISON: 02/13/2021  TECHNIQUE: CT scan of the abdomen and pelvis was performed following injection of IV contrast. Multiplanar reformats were obtained. Dose reduction techniques were used.  CONTRAST: 100 mL Isovue-370.    FINDINGS:   LOWER CHEST: Normal.    HEPATOBILIARY: Normal.    PANCREAS: Normal.    SPLEEN: Normal.    ADRENAL GLANDS: Normal.    KIDNEYS/BLADDER: No urinary tract calculus nor hydronephrosis. Normal kidneys, ureters and bladder.    BOWEL: Sigmoid diverticulosis. Mid sigmoid diverticulitis is similar segment Baumann the prior study. Small amount of extraluminal gas without discrete abscess. Probable fistula between the sigmoid colon and right lower quadrant small bowel seen on images   282-289 of series 4, and image 84 of coronal series 400.2. No obstruction. Normal appendix.    LYMPH NODES: No lymphadenopathy.    VASCULATURE: No aortoiliac aneurysm.    PELVIC ORGANS: Normal.    MUSCULOSKELETAL: Negative.      Impression    IMPRESSION:   1.  Acute diverticulitis mid sigmoid colon with small amount of extraluminal gas and no discrete abscess.  2.  Probable fistula between mid sigmoid colon and distal small bowel right lower quadrant, compatible with some degree of chronic inflammation.

## 2021-08-13 NOTE — PROGRESS NOTES
"General Surgery Progress Note:    Hospital Day # 1    ASSESSMENT:   1. Diverticulitis of colon    2. Colonic fistula        Nathaniel Ybarra is a 36 year old male admitted with diverticulitis. He was admitted for IV abx and bowel rest. He was given a regular diet last night and did tolerate this.     PLAN:   - home today from a surgery standpoint  - 14 day course of cipro/flagyl  - follow up with general surgery in a few weeks to discuss sigmoidectomy      SUBJECTIVE:   Nathaniel Ybarra is feeling better. His pain has resolved. Leukocytosis resolved.    Patient Vitals for the past 24 hrs:   BP Temp Temp src Pulse Resp SpO2 Height Weight   08/13/21 0721 121/82 98.5  F (36.9  C) Oral 77 18 96 % -- --   08/13/21 0513 127/76 97.6  F (36.4  C) Oral 66 18 97 % -- --   08/12/21 2322 131/77 98.6  F (37  C) Oral 68 18 98 % -- --   08/12/21 1910 (!) 143/90 97.9  F (36.6  C) Oral 71 18 97 % -- --   08/12/21 1511 (!) 140/85 98.4  F (36.9  C) Oral 62 18 98 % 1.676 m (5' 6\") 85.5 kg (188 lb 6.4 oz)   08/12/21 1430 (!) 144/90 -- -- 75 -- 98 % -- --   08/12/21 1415 118/70 -- -- 69 -- 97 % -- --   08/12/21 1400 139/72 -- -- 67 -- 97 % -- --   08/12/21 1345 132/86 -- -- 65 -- 97 % -- --   08/12/21 1330 (!) 157/97 -- -- 67 -- 97 % -- --   08/12/21 1315 (!) 168/96 -- -- 67 -- 97 % -- --   08/12/21 1300 123/82 -- -- 64 -- 98 % -- --   08/12/21 1245 124/87 -- -- 66 -- 96 % -- --   08/12/21 1230 (!) 134/92 -- -- 65 -- 98 % -- --   08/12/21 1215 (!) 135/96 -- -- 69 -- 95 % -- --   08/12/21 1200 (!) 144/105 -- -- 75 -- 97 % -- --   08/12/21 1145 (!) 131/100 -- -- 76 -- 97 % -- --   08/12/21 1030 (!) 165/101 -- -- 81 16 95 % -- --       Physical Exam:  General: NAD, pleasant  CV:RRR  LUNGS:CTA bilaterally  ABD: soft, nontender, not distended  EXT:no CCE    Admission on 08/12/2021   Component Date Value     Color Urine 08/12/2021 Yellow      Appearance Urine 08/12/2021 Clear      Glucose Urine 08/12/2021 Negative      Bilirubin Urine 08/12/2021 " Negative      Ketones Urine 08/12/2021 Negative      Specific Gravity Urine 08/12/2021 1.032*     Blood Urine 08/12/2021 Negative      pH Urine 08/12/2021 6.0      Protein Albumin Urine 08/12/2021 30 *     Urobilinogen Urine 08/12/2021 2.0*     Nitrite Urine 08/12/2021 Negative      Leukocyte Esterase Urine 08/12/2021 Negative      Mucus Urine 08/12/2021 Present*     RBC Urine 08/12/2021 1      WBC Urine 08/12/2021 1      Sodium 08/12/2021 141      Potassium 08/12/2021 3.7      Chloride 08/12/2021 110*     Carbon Dioxide (CO2) 08/12/2021 23      Anion Gap 08/12/2021 8      Urea Nitrogen 08/12/2021 7*     Creatinine 08/12/2021 0.86      Calcium 08/12/2021 9.4      Glucose 08/12/2021 114      GFR Estimate 08/12/2021 >90      Bilirubin Total 08/12/2021 1.1*     Bilirubin Direct 08/12/2021 0.4      Protein Total 08/12/2021 7.8      Albumin 08/12/2021 3.6      Alkaline Phosphatase 08/12/2021 122*     AST 08/12/2021 22      ALT 08/12/2021 29      Lipase 08/12/2021 <9      aPTT 08/12/2021 36      INR 08/12/2021 1.00      WBC Count 08/12/2021 12.8*     RBC Count 08/12/2021 5.38      Hemoglobin 08/12/2021 16.5      Hematocrit 08/12/2021 48.4      MCV 08/12/2021 90      MCH 08/12/2021 30.7      MCHC 08/12/2021 34.1      RDW 08/12/2021 12.7      Platelet Count 08/12/2021 191      % Neutrophils 08/12/2021 75      % Lymphocytes 08/12/2021 14      % Monocytes 08/12/2021 9      % Eosinophils 08/12/2021 2      % Basophils 08/12/2021 0      % Immature Granulocytes 08/12/2021 0      NRBCs per 100 WBC 08/12/2021 0      Absolute Neutrophils 08/12/2021 9.6*     Absolute Lymphocytes 08/12/2021 1.7      Absolute Monocytes 08/12/2021 1.1      Absolute Eosinophils 08/12/2021 0.3      Absolute Basophils 08/12/2021 0.0      Absolute Immature Granul* 08/12/2021 0.1*     Absolute NRBCs 08/12/2021 0.0      Culture 08/12/2021 No growth after 12 hours      SARS CoV2 PCR 08/12/2021 Negative      Sodium 08/13/2021 141      Potassium 08/13/2021 3.8       Chloride 08/13/2021 107      Carbon Dioxide (CO2) 08/13/2021 23      Anion Gap 08/13/2021 11      Urea Nitrogen 08/13/2021 6*     Creatinine 08/13/2021 0.82      Calcium 08/13/2021 9.3      Glucose 08/13/2021 100      Alkaline Phosphatase 08/13/2021 123*     AST 08/13/2021 21      ALT 08/13/2021 30      Protein Total 08/13/2021 7.4      Albumin 08/13/2021 3.3*     Bilirubin Total 08/13/2021 1.3*     GFR Estimate 08/13/2021 >90      WBC Count 08/13/2021 9.6      RBC Count 08/13/2021 5.10      Hemoglobin 08/13/2021 15.9      Hematocrit 08/13/2021 46.1      MCV 08/13/2021 90      MCH 08/13/2021 31.2      MCHC 08/13/2021 34.5      RDW 08/13/2021 12.6      Platelet Count 08/13/2021 193         Keli Ceron, APRN CNP

## 2021-08-13 NOTE — PLAN OF CARE
"  Patient discharged to home, ambulatory.   Discharge instructions reviewed with the patient & significant other.   Meds sent with: Flagyl & Cipro   Handout provided: Diverticulitis, Low Fiber Diet   Patient to follow-up with surgery team (see note). Phone number provided.      Problem: Adult Inpatient Plan of Care  Goal: Plan of Care Review  Outcome: Adequate for Discharge    D/A: Patient denied of abdominal pain/nausea this shift. He \"feels great and      ready to go home.\" Denies of nausea. Tolerated a regular meal. Independently      ambulating in the hallway this morning.  R: No acute issues.          "

## 2021-08-13 NOTE — PLAN OF CARE
Problem: Adult Inpatient Plan of Care  Goal: Plan of Care Review  Outcome: No Change   Pt getting iv zosyn. Slept most of the night.   Problem: Nausea and Vomiting (Intestinal Obstruction)  Goal: Nausea and Vomiting Relief  Outcome: No Change   Pt denies any nausea overnight.     Uneventful shift. Per pt he feels much better. Plan to discharge home today.  Keli Hurley RN

## 2021-08-13 NOTE — DISCHARGE INSTRUCTIONS
Call the surgery clinic with any questions or concerns:  [facility] [id number] [address] [address] [phone]

## 2021-08-13 NOTE — PROVIDER NOTIFICATION
PROVIDER NOTIFICATION    Reason for communication: Per patient request re: discharge plans.  Team member name:   Team member role: Resident/[place] team  Method of Communication: text paged  Response: awaiting for response  Response time:

## 2021-08-13 NOTE — DISCHARGE SUMMARY
[facility]  Discharge Summary - Medicine & Pediatrics       Date of Admission:  8/12/2021  Date of Discharge:  8/13/2021  Discharging Provider: Estelle Celaya MD   Discharge Service: PVC    Discharge Diagnoses   Patient Active Problem List   Diagnosis     Diverticulitis of colon     Colonic fistula     Follow-ups Needed After Discharge   Surgery follow up in 2 weeks for discussion of elective sigmoidectomy  Follow up with [facility] for establishing care with new PCP,post hospital follow up and BP    Unresulted Labs Ordered in the Past 30 Days of this Admission     Date and Time Order Name Status Description    8/12/2021 11:36 AM Blood Culture Line, venous Preliminary       These results will be followed up by PCP    Discharge Disposition   Discharged to home  Condition at discharge: Stable     Hospital Course   Nathaniel Ybarra is a 36 year old male admitted on 8/12/2021. He has a history of diverticulosis and elevated blood pressure, presenting with 4 days of severe abdominal pain and associated objective fevers and nausea, found to have acute diverticulitis of the mid sigmoid colon complicated by probable fistula.     Complicated diverticulitis with coloenteric fistula  Patient with 4 days of progressive, sharp lower quadrant abdominal pain with associated fevers and nausea.  Vital signs on arrival notable for elevated blood pressure up to 165/101.  Labs notable for leukocytosis of 12.8.  CT of abdomen reveals acute diverticulitis of the mid sigmoid colon, small amount of extraluminal gas, no abscess, and a probable fistula connecting the mid sigmoid colon to the distal small bowel with some chronic inflammation.  General surgery was consulted. Started on IV Zosyn, transitioned to PO Cipro/Flagyl x 14 days and f/u with surgery for discussion of sigmoidectomy.      Elevated Blood Pressure  Patient noted to have pressure up to 165/101, then 140s over 90s distantly after.   "Asymptomatic. Patient reports that he knows he has been told he has high blood pressure. He has not pursued treatment at this point; however, he is interested in getting his blood pressure under control so he may healthy for his family.  He does not have a primary care provider. He will follow up with [facility].       Consultations This Hospital Stay   SOCIAL WORK IP CONSULT    Code Status   Full Code       The patient was discussed with Dr. Min Mccabe \"Melisa\" MD Yomi  [facility] Medicine Resident  P: [phone]    [address] [address]  Phone: [phone]  Fax: [phone]  ______________________________________________________________________    Physical Exam   Vital Signs: Temp: 98.5  F (36.9  C) Temp src: Oral BP: 121/82 Pulse: 77   Resp: 18 SpO2: 96 % O2 Device: None (Room air)    Weight: 188 lbs 6.4 oz  Constitutional: awake, alert and cooperative, no acute distress  Eyes: Lids and lashes normal, pupils equal, round and reactive to light, sclera clear, conjunctiva normal  ENT: normocepalic, without obvious abnormality  Respiratory: clear to auscultation bilaterally, no crackles or wheezing  Cardiovascular: Normal apical impulse, regular rate and rhythm, no murmurs  GI: S, ND, NT  Skin: normal skin color, texture, turgor  Musculoskeletal: no lower extremity pitting edema present  Neurologic: Awake, alert.  Cranial nerves II-XII are grossly intact.    Neuropsychiatric: General: normal and normal eye contact    Primary Care Physician   Physician No Ref-Primary    Discharge Orders       Significant Results and Procedures   Results for orders placed or performed during the hospital encounter of 08/12/21   CT Abdomen Pelvis w Contrast    Narrative    EXAM: CT ABDOMEN PELVIS W CONTRAST  LOCATION: [facility]  DATE/TIME: 8/12/2021 10:17 AM    INDICATION: Lower abdominal pain. Previous history of " diverticulitis.  COMPARISON: 02/13/2021  TECHNIQUE: CT scan of the abdomen and pelvis was performed following injection of IV contrast. Multiplanar reformats were obtained. Dose reduction techniques were used.  CONTRAST: 100 mL Isovue-370.    FINDINGS:   LOWER CHEST: Normal.    HEPATOBILIARY: Normal.    PANCREAS: Normal.    SPLEEN: Normal.    ADRENAL GLANDS: Normal.    KIDNEYS/BLADDER: No urinary tract calculus nor hydronephrosis. Normal kidneys, ureters and bladder.    BOWEL: Sigmoid diverticulosis. Mid sigmoid diverticulitis is similar segment Baumann the prior study. Small amount of extraluminal gas without discrete abscess. Probable fistula between the sigmoid colon and right lower quadrant small bowel seen on images   282-289 of series 4, and image 84 of coronal series 400.2. No obstruction. Normal appendix.    LYMPH NODES: No lymphadenopathy.    VASCULATURE: No aortoiliac aneurysm.    PELVIC ORGANS: Normal.    MUSCULOSKELETAL: Negative.      Impression    IMPRESSION:   1.  Acute diverticulitis mid sigmoid colon with small amount of extraluminal gas and no discrete abscess.  2.  Probable fistula between mid sigmoid colon and distal small bowel right lower quadrant, compatible with some degree of chronic inflammation.       Discharge Medications   Current Discharge Medication List      START taking these medications    Details   ciprofloxacin (CIPRO) 500 MG tablet Take 1 tablet (500 mg) by mouth 2 times daily for 14 days  Qty: 28 tablet, Refills: 0    Associated Diagnoses: Diverticulitis of colon      metroNIDAZOLE (FLAGYL) 250 MG tablet Take 2 tablets (500 mg) by mouth 3 times daily for 14 days  Qty: 84 tablet, Refills: 0    Associated Diagnoses: Diverticulitis of colon           Allergies   No Known Allergies

## 2021-08-13 NOTE — PLAN OF CARE
Problem: Fluid Deficit (Intestinal Obstruction)  Goal: Fluid Balance  Outcome: Improving     Problem: Infection (Intestinal Obstruction)  Goal: Absence of Infection Signs and Symptoms  Outcome: Improving     Problem: Nausea and Vomiting (Intestinal Obstruction)  Goal: Nausea and Vomiting Relief  Outcome: Improving     Problem: Pain (Intestinal Obstruction)  Goal: Acceptable Pain Control  Outcome: Improving   Patient admitted for diverticulitis. Started  NPO then switched to reg diet. Patient will discharge tomorrow and follow up out patient, no surgical intervention for now. Patient received iv abx for infection prevention. Patient denies pain, n/v at this time. Tolerated food  and liquids well. Up independently , no safety concerns.

## 2021-08-17 LAB — BACTERIA BLD CULT: NO GROWTH

## 2021-08-20 NOTE — PATIENT INSTRUCTIONS
Colon education & surgery packet provided to pt.    [facility]  Surgery Clinic [facility]  Weight Management Clinic - [address] [address]  Office: [phone]  Fax: [phone]

## 2021-08-27 ENCOUNTER — OFFICE VISIT (OUTPATIENT)
Dept: SURGERY | Facility: CLINIC | Age: 36
End: 2021-08-27
Attending: NURSE PRACTITIONER
Payer: COMMERCIAL

## 2021-08-27 VITALS — HEIGHT: 66 IN | BODY MASS INDEX: 30.22 KG/M2 | WEIGHT: 188 LBS

## 2021-08-27 DIAGNOSIS — K57.32 DIVERTICULITIS OF COLON: ICD-10-CM

## 2021-08-27 PROCEDURE — 99214 OFFICE O/P EST MOD 30 MIN: CPT | Performed by: SPECIALIST

## 2021-08-27 RX ORDER — CEFAZOLIN SODIUM 2 G/100ML
2 INJECTION, SOLUTION INTRAVENOUS SEE ADMIN INSTRUCTIONS
Status: CANCELLED | OUTPATIENT
Start: 2022-02-28

## 2021-08-27 RX ORDER — CEFAZOLIN SODIUM 2 G/100ML
2 INJECTION, SOLUTION INTRAVENOUS
Status: CANCELLED | OUTPATIENT
Start: 2022-02-28

## 2021-08-27 ASSESSMENT — MIFFLIN-ST. JEOR: SCORE: 1725.51

## 2021-08-27 NOTE — LETTER
"    8/27/2021         RE: Nathaniel Ybarra  [address]  [place] [address]        Dear Colleague,    Thank you for referring your patient, Nathaniel Ybarra, to the [facility] [place]. Please see a copy of my visit note below.    [facility] Surgery Follow up    HPI:    36 year old year old male who returns for a follow up.  He had admission with diverticulitis had an area of involved bowel the bowel fistula noted in a small contained abscess.  He is doing remarkably better no complaints problems or issues at this time point.  Eating normally but would like to have some type of intervention about his large bowel large bowel fistula.    Allergies:Patient has no known allergies.    Past Medical History:   Diagnosis Date     NO ACTIVE PROBLEMS        Past Surgical History:   Procedure Laterality Date     NO HISTORY OF SURGERY         CURRENT MEDS:  No current outpatient medications on file.     No current facility-administered medications for this visit.       History reviewed. No pertinent family history.     reports that he has been smoking cigarettes. He has been smoking about 0.50 packs per day. He has never used smokeless tobacco. He reports that he does not drink alcohol and does not use drugs.    Review of Systems:  Negative except history of diverticulitis. No blood in stool or fevers or abdominal pain. Otherwise twelve system of review is negative.      OBJECTIVE:  Vitals:    08/27/21 1424   Weight: 85.3 kg (188 lb)   Height: 1.676 m (5' 6\")     Body mass index is 30.34 kg/m .    EXAM:  GENERAL: This is a well-developed 36 year old male who appears his stated age  HEAD: normocephalic  HEENT: Pupils equal and reactive bilaterally  CARDIAC: RRR without murmur  CHEST/LUNG:  Clear to auscultation  ABDOMEN: Soft, nontender, nondistended, no masses    NEUROLOGIC: Focally intact, nonfocal  VASCULAR: Pulses intact, symmetrical upper and lower extremities.        LABS:  Lab " Results   Component Value Date    WBC 9.6 08/13/2021    WBC 12.3 02/13/2021    HGB 15.9 08/13/2021    HGB 15.9 02/13/2021    HCT 46.1 08/13/2021    HCT 46.1 02/13/2021    MCV 90 08/13/2021    MCV 88 02/13/2021     08/13/2021     02/13/2021     Lab Results   Component Value Date    ALT 30 08/13/2021    AST 21 08/13/2021    ALKPHOS 123 (H) 08/13/2021        Images:     Study Result    Narrative & Impression   EXAM: CT ABDOMEN PELVIS W CONTRAST  LOCATION: [facility]  DATE/TIME: 8/12/2021 10:17 AM     INDICATION: Lower abdominal pain. Previous history of diverticulitis.  COMPARISON: 02/13/2021  TECHNIQUE: CT scan of the abdomen and pelvis was performed following injection of IV contrast. Multiplanar reformats were obtained. Dose reduction techniques were used.  CONTRAST: 100 mL Isovue-370.     FINDINGS:   LOWER CHEST: Normal.     HEPATOBILIARY: Normal.     PANCREAS: Normal.     SPLEEN: Normal.     ADRENAL GLANDS: Normal.     KIDNEYS/BLADDER: No urinary tract calculus nor hydronephrosis. Normal kidneys, ureters and bladder.     BOWEL: Sigmoid diverticulosis. Mid sigmoid diverticulitis is similar segment Baumann the prior study. Small amount of extraluminal gas without discrete abscess. Probable fistula between the sigmoid colon and right lower quadrant small bowel seen on images   282-289 of series 4, and image 84 of coronal series 400.2. No obstruction. Normal appendix.     LYMPH NODES: No lymphadenopathy.     VASCULATURE: No aortoiliac aneurysm.     PELVIC ORGANS: Normal.     MUSCULOSKELETAL: Negative.                                                                      IMPRESSION:   1.  Acute diverticulitis mid sigmoid colon with small amount of extraluminal gas and no discrete abscess.  2.  Probable fistula between mid sigmoid colon and distal small bowel right lower quadrant, compatible with some degree of chronic inflammation.         Assessment/Plan:   Diverticulitis of colon      Discussed doing surgery for this to laparoscopic colon resection she will resect this area small risk of potential to resect small bowel but with the looks of the CT scan findings should be able to to take this segment out and take care of all this in 1 issue.  It is to be done in the hospital hospital stay of 2 to 5 days were discussed risks of infection bleeding anesthesia anastomotic breakdown and death were all discussed and he wished to proceed.  He will need a bowel prep, orders have been placed    No follow-ups on file.     Jonathan Rosales MD ,MD  [facility] Department of Surgery      Again, thank you for allowing me to participate in the care of your patient.        Sincerely,        Jonathan Rosales MD

## 2021-09-02 NOTE — PROGRESS NOTES
"[facility] Surgery Follow up    HPI:    36 year old year old male who returns for a follow up.  He had admission with diverticulitis had an area of involved bowel the bowel fistula noted in a small contained abscess.  He is doing remarkably better no complaints problems or issues at this time point.  Eating normally but would like to have some type of intervention about his large bowel large bowel fistula.    Allergies:Patient has no known allergies.    Past Medical History:   Diagnosis Date     NO ACTIVE PROBLEMS        Past Surgical History:   Procedure Laterality Date     NO HISTORY OF SURGERY         CURRENT MEDS:  No current outpatient medications on file.     No current facility-administered medications for this visit.       History reviewed. No pertinent family history.     reports that he has been smoking cigarettes. He has been smoking about 0.50 packs per day. He has never used smokeless tobacco. He reports that he does not drink alcohol and does not use drugs.    Review of Systems:  Negative except history of diverticulitis. No blood in stool or fevers or abdominal pain. Otherwise twelve system of review is negative.      OBJECTIVE:  Vitals:    08/27/21 1424   Weight: 85.3 kg (188 lb)   Height: 1.676 m (5' 6\")     Body mass index is 30.34 kg/m .    EXAM:  GENERAL: This is a well-developed 36 year old male who appears his stated age  HEAD: normocephalic  HEENT: Pupils equal and reactive bilaterally  CARDIAC: RRR without murmur  CHEST/LUNG:  Clear to auscultation  ABDOMEN: Soft, nontender, nondistended, no masses    NEUROLOGIC: Focally intact, nonfocal  VASCULAR: Pulses intact, symmetrical upper and lower extremities.        LABS:  Lab Results   Component Value Date    WBC 9.6 08/13/2021    WBC 12.3 02/13/2021    HGB 15.9 08/13/2021    HGB 15.9 02/13/2021    HCT 46.1 08/13/2021    HCT 46.1 02/13/2021    MCV 90 08/13/2021    MCV 88 02/13/2021     08/13/2021     02/13/2021     Lab Results "   Component Value Date    ALT 30 08/13/2021    AST 21 08/13/2021    ALKPHOS 123 (H) 08/13/2021        Images:     Study Result    Narrative & Impression   EXAM: CT ABDOMEN PELVIS W CONTRAST  LOCATION: [facility]  DATE/TIME: 8/12/2021 10:17 AM     INDICATION: Lower abdominal pain. Previous history of diverticulitis.  COMPARISON: 02/13/2021  TECHNIQUE: CT scan of the abdomen and pelvis was performed following injection of IV contrast. Multiplanar reformats were obtained. Dose reduction techniques were used.  CONTRAST: 100 mL Isovue-370.     FINDINGS:   LOWER CHEST: Normal.     HEPATOBILIARY: Normal.     PANCREAS: Normal.     SPLEEN: Normal.     ADRENAL GLANDS: Normal.     KIDNEYS/BLADDER: No urinary tract calculus nor hydronephrosis. Normal kidneys, ureters and bladder.     BOWEL: Sigmoid diverticulosis. Mid sigmoid diverticulitis is similar segment Baumann the prior study. Small amount of extraluminal gas without discrete abscess. Probable fistula between the sigmoid colon and right lower quadrant small bowel seen on images   282-289 of series 4, and image 84 of coronal series 400.2. No obstruction. Normal appendix.     LYMPH NODES: No lymphadenopathy.     VASCULATURE: No aortoiliac aneurysm.     PELVIC ORGANS: Normal.     MUSCULOSKELETAL: Negative.                                                                      IMPRESSION:   1.  Acute diverticulitis mid sigmoid colon with small amount of extraluminal gas and no discrete abscess.  2.  Probable fistula between mid sigmoid colon and distal small bowel right lower quadrant, compatible with some degree of chronic inflammation.         Assessment/Plan:   Diverticulitis of colon     Discussed doing surgery for this to laparoscopic colon resection she will resect this area small risk of potential to resect small bowel but with the looks of the CT scan findings should be able to to take this segment out and take care of all this in 1 issue.  It  is to be done in the hospital hospital stay of 2 to 5 days were discussed risks of infection bleeding anesthesia anastomotic breakdown and death were all discussed and he wished to proceed.  He will need a bowel prep, orders have been placed    No follow-ups on file.     Jonathan Rosales MD ,MD  [facility] Department of Surgery

## 2021-09-07 ENCOUNTER — TELEPHONE (OUTPATIENT)
Dept: SURGERY | Facility: CLINIC | Age: 36
End: 2021-09-07

## 2021-09-07 NOTE — TELEPHONE ENCOUNTER
Left message for patient to return their call about scheduling. At this time were are not able to schedule AM Admit cases that are below a Tier 2 level. I will be in contact with him as things change. If he has further questions I asked him to reach out to me.

## 2021-09-08 NOTE — TELEPHONE ENCOUNTER
Patient called back and left a message last night. I attempted to call him back again to answer questions and left a message.

## 2021-09-09 NOTE — TELEPHONE ENCOUNTER
Left another message for Nathaniel explaining the reason his case can not be scheduled at this time. Told him to call if he had further questions

## 2021-09-10 NOTE — TELEPHONE ENCOUNTER
Left fourth message for Nathaniel explaining that I have been trying to contact him all week, I received another voicemail from a Brandy Longo and when I tried to return her call it said she was not accepting calls at this time.

## 2021-09-10 NOTE — TELEPHONE ENCOUNTER
Attempted to call Brandy back after her voicemail was left. When calling her number it says that this number is not taking calls at this time. No voicemail option

## 2021-11-20 ENCOUNTER — NURSE TRIAGE (OUTPATIENT)
Dept: NURSING | Facility: CLINIC | Age: 36
End: 2021-11-20
Payer: COMMERCIAL

## 2021-11-20 NOTE — TELEPHONE ENCOUNTER
Patient calling reporting having right lower abdominal pain. Rates abdominal pain as moderate. Denies fever. States pain started 15 minutes ago. Per guideline, advised home care disposition. Caller verbalized understanding. Denies further questions.      Ivan Noe RN  [facility]     COVID 19 Nurse Triage Plan/Patient Instructions    Please be aware that novel coronavirus (COVID-19) may be circulating in the community. If you develop symptoms such as fever, cough, or SOB or if you have concerns about the presence of another infection including coronavirus (COVID-19), please contact your health care provider or visit [email or web address].     Disposition/Instructions    Home care recommended. Follow home care protocol based instructions.    Thank you for taking steps to prevent the spread of this virus.  o Limit your contact with others.  o Wear a simple mask to cover your cough.  o Wash your hands well and often.    Resources    [facility]: About COVID-19: [email or web address]    CDC: What to Do If You're Sick: www.cdc.gov/coronavirus/2019-ncov/about/steps-when-sick.html    CDC: Ending Home Isolation: www.cdc.gov/coronavirus/2019-ncov/hcp/disposition-in-home-patients.html     CDC: Caring for Someone: www.cdc.gov/coronavirus/2019-ncov/if-you-are-sick/care-for-someone.html     [facility]: Interim Guidance for Hospital Discharge to Home: www.health.Count includes the [facility].[place]/diseases/coronavirus/hcp/hospdischarge.pdf    [facility] clinical trials (COVID-19 research studies): clinicalaffairs.[facility]-clinical-trials     Below are the COVID-19 hotlines at the [facility] ([facility]). Interpreters are available.   o For health questions: Call 512-782-7996 or 1-810.268.3798 (7 a.m. to 7 p.m.)  o For questions about schools and childcare: Call 923-096-0845 or 1-846.375.7232 (7 a.m. to 7 p.m.)                       Reason for Disposition    [1] MILD-MODERATE pain AND  "[2] constant and [3] present < 2 hours    Additional Information    Negative: Shock suspected (e.g., cold/pale/clammy skin, too weak to stand, low BP, rapid pulse)    Negative: Difficult to awaken or acting confused (e.g., disoriented, slurred speech)    Negative: Passed out (i.e., lost consciousness, collapsed and was not responding)    Negative: Sounds like a life-threatening emergency to the triager    Negative: [1] SEVERE pain (e.g., excruciating) AND [2] present > 1 hour    Negative: [1] SEVERE pain AND [2] age > 60    Negative: [1] Vomiting AND [2] contains red blood or black (\"coffee ground\") material  (Exception: few red streaks in vomit that only happened once)    Negative: Blood in bowel movements  (Exception: Blood on surface of BM with constipation)    Negative: Black or tarry bowel movements  (Exception: chronic-unchanged  black-grey bowel movements AND is taking iron pills or Pepto-bismol)    Negative: [1] Unable to urinate (or only a few drops) > 4 hours AND [2] bladder feels very full (e.g., palpable bladder or strong urge to urinate)    Negative: [1] Pain in the scrotum or testicle AND [2] present > 1 hour    Negative: Patient sounds very sick or weak to the triager    Negative: [1] MILD-MODERATE pain AND [2] constant AND [3] present > 2 hours    Negative: [1] Vomiting AND [2] abdomen looks much more swollen than usual    Negative: [1] Vomiting AND [2] contains bile (green color)    Negative: White of the eyes have turned yellow (i.e., jaundice)    Negative: Fever > 103 F (39.4 C)    Negative: [1] Fever > 101 F (38.3 C) AND [2] age > 60    Negative: [1] Fever > 100.0 F (37.8 C) AND [2] bedridden (e.g., nursing home patient, CVA, chronic illness, recovering from surgery)    Negative: [1] Fever > 100.0 F (37.8 C) AND [2] diabetes mellitus or weak immune system (e.g., HIV positive, cancer chemo, splenectomy, organ transplant, chronic steroids)    Negative: [1] SEVERE pain AND [2] present < 1 hour    " Negative: [1] MODERATE pain (e.g., interferes with normal activities) AND [2] pain comes and goes (cramps) AND [3] present > 24 hours  (Exception: pain with Vomiting or Diarrhea - see that Guideline)    Negative: [1] MILD pain (e.g., does not interfere with normal activities) AND [2] pain comes and goes (cramps) [3] present > 48 hours    Negative: Age > 60 years    Negative: Blood in urine (red, pink, or tea-colored)    Negative: Abdominal pain is a chronic symptom (recurrent or ongoing AND present > 4 weeks)    Protocols used: ABDOMINAL PAIN - MALE-A-

## 2022-01-05 ENCOUNTER — TELEPHONE (OUTPATIENT)
Dept: SURGERY | Facility: CLINIC | Age: 37
End: 2022-01-05
Payer: COMMERCIAL

## 2022-01-05 NOTE — TELEPHONE ENCOUNTER
Spoke with Nathaniel today after he called and left a message regarding his surgery to be scheduled.    Nathaniel has been waiting since September for their surgery to be scheduled due to restrictions at the hospitals. The case is currently a Tier 3 level requiring an overnight stay.    I did inform Nathaniel that I have sent all Tier 3 cases to the providers to have them reviewed. If we are able to start scheduling I will be in contact with Nathaniel. He agreed with this plan.      Nathaniel did inform me that he has been changing his eating habits to reduce the pain but is still having pain.

## 2022-02-10 ENCOUNTER — TELEPHONE (OUTPATIENT)
Dept: SURGERY | Facility: CLINIC | Age: 37
End: 2022-02-10
Payer: COMMERCIAL

## 2022-02-10 DIAGNOSIS — Z11.59 ENCOUNTER FOR SCREENING FOR OTHER VIRAL DISEASES: Primary | ICD-10-CM

## 2022-02-10 NOTE — LETTER
We've received instruction to get you scheduled for surgery with Dr Rosales. We have that arranged as follows:     Surgery Date: 2/28/2022  [place] [address]   Arrival Time: 11:30 am (unless instructed otherwise by the pre-op nurse)    Post op Appointment: 3/21/2022 at 11:20 am with Dr. Rosales    Prep Instructions:     1. Please schedule a pre-op physical with your primary care doctor. This may be virtual or face-to-face depending on your doctors preference. Call them right away to schedule this.    2. PCR-Rated COVID19 testing is required within 4 days of surgery. We have this scheduled for you at [facility], [address] [place] [address], 1st Floor on 2/24/2022 at 4:00pm. Follow the specific instructions you receive by Leo. If your test is positive, your surgery will be canceled.     3. Nothing to eat or drink for 8 hours before surgery unless instructed differently by the pre-op nurse.    4. Your surgeon prefers no blood thinners including aspirin for one week prior to surgery but you must verify this is safe for you with your prescribing doctor before stopping.     5. Visitor restrictions are in place due to the pandemic. One visitor is allowed for adult surgical patients. Please verify this with the pre-op nurse when they call before surgery because it is subject to change.    6. If you are going home the same day of surgery, you need an adult to drive you home and stay with you 24 hours after surgery.  You cannot use public transportation or medi cab services.    7. You will be screened for high-risk exposure to Covid-19 during the pre-op call.  We encourage you to quarantine yourself away from any Covid-19 people for 10 days before surgery to avoid possible last minute cancellations.   When you arrive to the hospital, you will again be screened for COVID19 symptoms. If you screen positive, your surgery will need to be postponed.     8. The community is  experiencing a surge in COVID19 hospital admissions which is impacting bed availability at all hospitals. If you are being admitted overnight or longer following surgery, please be aware that your procedure could be cancelled as a result.     9. We always encourage you to notify your insurance any time you have something scheduled including surgery. The number is usually right on the back of your insurance card. Please call  [employer] [facility] [employer] Care at [phone] if you need pricing information.     10. A Pre-op call nurse will call you the day before surgery to go over more details with you to prepare for surgery.    Call our office if you have any questions! Thank you!

## 2022-02-10 NOTE — TELEPHONE ENCOUNTER
Left a message to let Nathaniel know I was able to schedule his surgery with Dr. Rosales for 2/28 at [facility].    covid test 2/24 at 4pm [facility]  [facility]    I let him know that a pre op physical would need to be done prior to surgery and he will need to contact his primary for that. I will send out a letter with the details for surgery but he can call me back to discuss the details.

## 2022-02-22 ENCOUNTER — TELEPHONE (OUTPATIENT)
Dept: SURGERY | Facility: CLINIC | Age: 37
End: 2022-02-22
Payer: COMMERCIAL

## 2022-02-22 NOTE — TELEPHONE ENCOUNTER
Left a message for Nathaniel regarding surgery details. He called back and left  Two messages to go over the details of surgery that has been scheduled. I tried to call him back today to go over the letter that has been mailed out to him. Informed him that I will be out of the office the rest of the week and returning on Monday. He can also speak with Diamond while I am gone this week.

## 2022-02-24 ENCOUNTER — LAB (OUTPATIENT)
Dept: LAB | Facility: CLINIC | Age: 37
End: 2022-02-24
Payer: COMMERCIAL

## 2022-02-24 DIAGNOSIS — Z11.59 ENCOUNTER FOR SCREENING FOR OTHER VIRAL DISEASES: ICD-10-CM

## 2022-02-24 PROCEDURE — U0005 INFEC AGEN DETEC AMPLI PROBE: HCPCS

## 2022-02-24 PROCEDURE — U0003 INFECTIOUS AGENT DETECTION BY NUCLEIC ACID (DNA OR RNA); SEVERE ACUTE RESPIRATORY SYNDROME CORONAVIRUS 2 (SARS-COV-2) (CORONAVIRUS DISEASE [COVID-19]), AMPLIFIED PROBE TECHNIQUE, MAKING USE OF HIGH THROUGHPUT TECHNOLOGIES AS DESCRIBED BY CMS-2020-01-R: HCPCS

## 2022-02-25 ENCOUNTER — TELEPHONE (OUTPATIENT)
Dept: SURGERY | Facility: CLINIC | Age: 37
End: 2022-02-25
Payer: COMMERCIAL

## 2022-02-25 DIAGNOSIS — Z79.2 PROPHYLACTIC ANTIBIOTIC: Primary | ICD-10-CM

## 2022-02-25 LAB — SARS-COV-2 RNA RESP QL NAA+PROBE: NEGATIVE

## 2022-02-25 RX ORDER — METRONIDAZOLE 500 MG/1
TABLET ORAL
Qty: 6 TABLET | Refills: 0 | Status: ON HOLD | OUTPATIENT
Start: 2022-02-25 | End: 2022-02-28

## 2022-02-25 RX ORDER — NEOMYCIN SULFATE 500 MG/1
TABLET ORAL
Qty: 6 TABLET | Refills: 0 | Status: ON HOLD | OUTPATIENT
Start: 2022-02-25 | End: 2022-02-28

## 2022-02-25 NOTE — OR NURSING
Called Dr. Rosales's office x 3 to verify bowel prep instructions. Pt attempted to contact Maureen several times with no return call.   Instructions were sent to him from me via text.

## 2022-02-25 NOTE — TELEPHONE ENCOUNTER
Bowel prep was scanned to Pre-op RN for Nathaniel who has surgery on 2/28/22 with Dr. Rosales    Scanned to [email or web address]     She will be reaching out to the patient and giving him further instructions.

## 2022-02-28 ENCOUNTER — HOSPITAL ENCOUNTER (INPATIENT)
Facility: HOSPITAL | Age: 37
LOS: 3 days | Discharge: HOME | DRG: 330 | End: 2022-03-03
Attending: SPECIALIST | Admitting: SPECIALIST
Payer: COMMERCIAL

## 2022-02-28 ENCOUNTER — ANESTHESIA (OUTPATIENT)
Dept: SURGERY | Facility: HOSPITAL | Age: 37
DRG: 330 | End: 2022-02-28
Payer: COMMERCIAL

## 2022-02-28 ENCOUNTER — ANESTHESIA EVENT (OUTPATIENT)
Dept: SURGERY | Facility: HOSPITAL | Age: 37
DRG: 330 | End: 2022-02-28
Payer: COMMERCIAL

## 2022-02-28 DIAGNOSIS — K57.32 SIGMOID DIVERTICULITIS: Primary | ICD-10-CM

## 2022-02-28 PROBLEM — K63.2 COLONIC FISTULA: Status: ACTIVE | Noted: 2021-08-12

## 2022-02-28 LAB
ERYTHROCYTE [DISTWIDTH] IN BLOOD BY AUTOMATED COUNT: 11.8 % (ref 10–15)
HCT VFR BLD AUTO: 42.7 % (ref 40–53)
HGB BLD-MCNC: 14.6 G/DL (ref 13.3–17.7)
MCH RBC QN AUTO: 30.9 PG (ref 26.5–33)
MCHC RBC AUTO-ENTMCNC: 34.2 G/DL (ref 31.5–36.5)
MCV RBC AUTO: 90 FL (ref 78–100)
PLATELET # BLD AUTO: 186 10E3/UL (ref 150–450)
RBC # BLD AUTO: 4.73 10E6/UL (ref 4.4–5.9)
WBC # BLD AUTO: 10.4 10E3/UL (ref 4–11)

## 2022-02-28 PROCEDURE — 250N000009 HC RX 250: Performed by: NURSE ANESTHETIST, CERTIFIED REGISTERED

## 2022-02-28 PROCEDURE — 250N000013 HC RX MED GY IP 250 OP 250 PS 637: Performed by: ANESTHESIOLOGY

## 2022-02-28 PROCEDURE — 258N000003 HC RX IP 258 OP 636: Performed by: ANESTHESIOLOGY

## 2022-02-28 PROCEDURE — 250N000011 HC RX IP 250 OP 636: Performed by: NURSE ANESTHETIST, CERTIFIED REGISTERED

## 2022-02-28 PROCEDURE — 88307 TISSUE EXAM BY PATHOLOGIST: CPT | Mod: TC | Performed by: SPECIALIST

## 2022-02-28 PROCEDURE — 250N000025 HC SEVOFLURANE, PER MIN: Performed by: SPECIALIST

## 2022-02-28 PROCEDURE — 258N000001 HC RX 258: Performed by: SPECIALIST

## 2022-02-28 PROCEDURE — 88307 TISSUE EXAM BY PATHOLOGIST: CPT | Mod: 26 | Performed by: PATHOLOGY

## 2022-02-28 PROCEDURE — 0WQF4ZZ REPAIR ABDOMINAL WALL, PERCUTANEOUS ENDOSCOPIC APPROACH: ICD-10-PCS | Performed by: SPECIALIST

## 2022-02-28 PROCEDURE — 85027 COMPLETE CBC AUTOMATED: CPT | Performed by: STUDENT IN AN ORGANIZED HEALTH CARE EDUCATION/TRAINING PROGRAM

## 2022-02-28 PROCEDURE — C9290 INJ, BUPIVACAINE LIPOSOME: HCPCS | Performed by: ANESTHESIOLOGY

## 2022-02-28 PROCEDURE — 250N000011 HC RX IP 250 OP 636: Performed by: ANESTHESIOLOGY

## 2022-02-28 PROCEDURE — 272N000001 HC OR GENERAL SUPPLY STERILE: Performed by: SPECIALIST

## 2022-02-28 PROCEDURE — 250N000009 HC RX 250: Performed by: SPECIALIST

## 2022-02-28 PROCEDURE — 999N000141 HC STATISTIC PRE-PROCEDURE NURSING ASSESSMENT: Performed by: SPECIALIST

## 2022-02-28 PROCEDURE — 0DJD8ZZ INSPECTION OF LOWER INTESTINAL TRACT, VIA NATURAL OR ARTIFICIAL OPENING ENDOSCOPIC: ICD-10-PCS | Performed by: SPECIALIST

## 2022-02-28 PROCEDURE — 120N000001 HC R&B MED SURG/OB

## 2022-02-28 PROCEDURE — 258N000003 HC RX IP 258 OP 636: Performed by: NURSE ANESTHETIST, CERTIFIED REGISTERED

## 2022-02-28 PROCEDURE — 360N000077 HC SURGERY LEVEL 4, PER MIN: Performed by: SPECIALIST

## 2022-02-28 PROCEDURE — 0DBN4ZZ EXCISION OF SIGMOID COLON, PERCUTANEOUS ENDOSCOPIC APPROACH: ICD-10-PCS | Performed by: SPECIALIST

## 2022-02-28 PROCEDURE — 710N000009 HC RECOVERY PHASE 1, LEVEL 1, PER MIN: Performed by: SPECIALIST

## 2022-02-28 PROCEDURE — 250N000013 HC RX MED GY IP 250 OP 250 PS 637: Performed by: SPECIALIST

## 2022-02-28 PROCEDURE — 258N000003 HC RX IP 258 OP 636: Performed by: SPECIALIST

## 2022-02-28 PROCEDURE — 250N000011 HC RX IP 250 OP 636: Performed by: SPECIALIST

## 2022-02-28 PROCEDURE — 44207 L COLECTOMY/COLOPROCTOSTOMY: CPT | Performed by: SPECIALIST

## 2022-02-28 PROCEDURE — 49585 PR REPAIR UMBILICAL HERN,5+Y/O,REDUC: CPT | Mod: 51 | Performed by: SPECIALIST

## 2022-02-28 PROCEDURE — 36415 COLL VENOUS BLD VENIPUNCTURE: CPT | Performed by: STUDENT IN AN ORGANIZED HEALTH CARE EDUCATION/TRAINING PROGRAM

## 2022-02-28 PROCEDURE — 370N000017 HC ANESTHESIA TECHNICAL FEE, PER MIN: Performed by: SPECIALIST

## 2022-02-28 PROCEDURE — 44238 UNLISTED LAPS PX INTESTINE: CPT | Mod: 59 | Performed by: SPECIALIST

## 2022-02-28 RX ORDER — CEFAZOLIN SODIUM 2 G/100ML
2 INJECTION, SOLUTION INTRAVENOUS
Status: DISCONTINUED | OUTPATIENT
Start: 2022-02-28 | End: 2022-02-28 | Stop reason: HOSPADM

## 2022-02-28 RX ORDER — CEFAZOLIN SODIUM 2 G/100ML
2 INJECTION, SOLUTION INTRAVENOUS SEE ADMIN INSTRUCTIONS
Status: DISCONTINUED | OUTPATIENT
Start: 2022-02-28 | End: 2022-02-28 | Stop reason: HOSPADM

## 2022-02-28 RX ORDER — ONDANSETRON 2 MG/ML
4 INJECTION INTRAMUSCULAR; INTRAVENOUS EVERY 6 HOURS PRN
Status: DISCONTINUED | OUTPATIENT
Start: 2022-02-28 | End: 2022-03-03 | Stop reason: HOSPADM

## 2022-02-28 RX ORDER — POLYETHYLENE GLYCOL 3350 17 G/17G
17 POWDER, FOR SOLUTION ORAL DAILY
Status: DISCONTINUED | OUTPATIENT
Start: 2022-03-01 | End: 2022-03-03 | Stop reason: HOSPADM

## 2022-02-28 RX ORDER — ACETAMINOPHEN 325 MG/1
975 TABLET ORAL ONCE
Status: COMPLETED | OUTPATIENT
Start: 2022-02-28 | End: 2022-02-28

## 2022-02-28 RX ORDER — SODIUM CHLORIDE, SODIUM LACTATE, POTASSIUM CHLORIDE, CALCIUM CHLORIDE 600; 310; 30; 20 MG/100ML; MG/100ML; MG/100ML; MG/100ML
INJECTION, SOLUTION INTRAVENOUS CONTINUOUS
Status: DISCONTINUED | OUTPATIENT
Start: 2022-02-28 | End: 2022-03-02

## 2022-02-28 RX ORDER — HALOPERIDOL 5 MG/ML
1 INJECTION INTRAMUSCULAR
Status: DISCONTINUED | OUTPATIENT
Start: 2022-02-28 | End: 2022-02-28 | Stop reason: HOSPADM

## 2022-02-28 RX ORDER — FENTANYL CITRATE 50 UG/ML
25 INJECTION, SOLUTION INTRAMUSCULAR; INTRAVENOUS EVERY 5 MIN PRN
Status: DISCONTINUED | OUTPATIENT
Start: 2022-02-28 | End: 2022-02-28 | Stop reason: HOSPADM

## 2022-02-28 RX ORDER — HYDROMORPHONE HCL IN WATER/PF 6 MG/30 ML
0.2 PATIENT CONTROLLED ANALGESIA SYRINGE INTRAVENOUS
Status: DISCONTINUED | OUTPATIENT
Start: 2022-02-28 | End: 2022-03-03 | Stop reason: HOSPADM

## 2022-02-28 RX ORDER — FENTANYL CITRATE 50 UG/ML
50 INJECTION, SOLUTION INTRAMUSCULAR; INTRAVENOUS
Status: DISCONTINUED | OUTPATIENT
Start: 2022-02-28 | End: 2022-02-28 | Stop reason: HOSPADM

## 2022-02-28 RX ORDER — SODIUM CHLORIDE, SODIUM LACTATE, POTASSIUM CHLORIDE, AND CALCIUM CHLORIDE .6; .31; .03; .02 G/100ML; G/100ML; G/100ML; G/100ML
IRRIGANT IRRIGATION PRN
Status: DISCONTINUED | OUTPATIENT
Start: 2022-02-28 | End: 2022-02-28 | Stop reason: HOSPADM

## 2022-02-28 RX ORDER — HYDROMORPHONE HCL IN WATER/PF 6 MG/30 ML
0.4 PATIENT CONTROLLED ANALGESIA SYRINGE INTRAVENOUS
Status: DISCONTINUED | OUTPATIENT
Start: 2022-02-28 | End: 2022-03-03 | Stop reason: HOSPADM

## 2022-02-28 RX ORDER — LIDOCAINE 40 MG/G
CREAM TOPICAL
Status: DISCONTINUED | OUTPATIENT
Start: 2022-02-28 | End: 2022-02-28 | Stop reason: HOSPADM

## 2022-02-28 RX ORDER — KETOROLAC TROMETHAMINE 30 MG/ML
INJECTION, SOLUTION INTRAMUSCULAR; INTRAVENOUS PRN
Status: DISCONTINUED | OUTPATIENT
Start: 2022-02-28 | End: 2022-02-28

## 2022-02-28 RX ORDER — DEXAMETHASONE SODIUM PHOSPHATE 10 MG/ML
INJECTION, SOLUTION INTRAMUSCULAR; INTRAVENOUS PRN
Status: DISCONTINUED | OUTPATIENT
Start: 2022-02-28 | End: 2022-02-28

## 2022-02-28 RX ORDER — OXYCODONE HYDROCHLORIDE 5 MG/1
10 TABLET ORAL EVERY 4 HOURS PRN
Status: DISCONTINUED | OUTPATIENT
Start: 2022-02-28 | End: 2022-03-03 | Stop reason: HOSPADM

## 2022-02-28 RX ORDER — OXYCODONE HYDROCHLORIDE 5 MG/1
5 TABLET ORAL EVERY 4 HOURS PRN
Status: DISCONTINUED | OUTPATIENT
Start: 2022-02-28 | End: 2022-02-28 | Stop reason: HOSPADM

## 2022-02-28 RX ORDER — ONDANSETRON 4 MG/1
4 TABLET, ORALLY DISINTEGRATING ORAL EVERY 6 HOURS PRN
Status: DISCONTINUED | OUTPATIENT
Start: 2022-02-28 | End: 2022-03-03 | Stop reason: HOSPADM

## 2022-02-28 RX ORDER — FAMOTIDINE 20 MG/1
20 TABLET, FILM COATED ORAL 2 TIMES DAILY
Status: DISCONTINUED | OUTPATIENT
Start: 2022-02-28 | End: 2022-03-03 | Stop reason: HOSPADM

## 2022-02-28 RX ORDER — NALOXONE HYDROCHLORIDE 1 MG/ML
0.4 INJECTION INTRAMUSCULAR; INTRAVENOUS; SUBCUTANEOUS
Status: DISCONTINUED | OUTPATIENT
Start: 2022-02-28 | End: 2022-03-03 | Stop reason: HOSPADM

## 2022-02-28 RX ORDER — GABAPENTIN 100 MG/1
100 CAPSULE ORAL 3 TIMES DAILY
Status: DISCONTINUED | OUTPATIENT
Start: 2022-02-28 | End: 2022-03-03 | Stop reason: HOSPADM

## 2022-02-28 RX ORDER — ACETAMINOPHEN 325 MG/1
975 TABLET ORAL EVERY 8 HOURS
Status: DISCONTINUED | OUTPATIENT
Start: 2022-02-28 | End: 2022-03-03 | Stop reason: HOSPADM

## 2022-02-28 RX ORDER — ACETAMINOPHEN 325 MG/1
650 TABLET ORAL EVERY 4 HOURS PRN
Status: DISCONTINUED | OUTPATIENT
Start: 2022-03-03 | End: 2022-03-03 | Stop reason: HOSPADM

## 2022-02-28 RX ORDER — BISACODYL 10 MG
10 SUPPOSITORY, RECTAL RECTAL DAILY PRN
Status: DISCONTINUED | OUTPATIENT
Start: 2022-02-28 | End: 2022-03-03 | Stop reason: HOSPADM

## 2022-02-28 RX ORDER — ONDANSETRON 4 MG/1
4 TABLET, ORALLY DISINTEGRATING ORAL EVERY 30 MIN PRN
Status: DISCONTINUED | OUTPATIENT
Start: 2022-02-28 | End: 2022-02-28 | Stop reason: HOSPADM

## 2022-02-28 RX ORDER — MAGNESIUM SULFATE 4 G/50ML
4 INJECTION INTRAVENOUS ONCE
Status: COMPLETED | OUTPATIENT
Start: 2022-02-28 | End: 2022-02-28

## 2022-02-28 RX ORDER — PROCHLORPERAZINE MALEATE 10 MG
10 TABLET ORAL EVERY 6 HOURS PRN
Status: DISCONTINUED | OUTPATIENT
Start: 2022-02-28 | End: 2022-03-03 | Stop reason: HOSPADM

## 2022-02-28 RX ORDER — KETOROLAC TROMETHAMINE 30 MG/ML
15 INJECTION, SOLUTION INTRAMUSCULAR; INTRAVENOUS EVERY 6 HOURS
Status: DISPENSED | OUTPATIENT
Start: 2022-02-28 | End: 2022-03-01

## 2022-02-28 RX ORDER — SODIUM CHLORIDE, SODIUM LACTATE, POTASSIUM CHLORIDE, CALCIUM CHLORIDE 600; 310; 30; 20 MG/100ML; MG/100ML; MG/100ML; MG/100ML
INJECTION, SOLUTION INTRAVENOUS CONTINUOUS
Status: DISCONTINUED | OUTPATIENT
Start: 2022-02-28 | End: 2022-02-28 | Stop reason: HOSPADM

## 2022-02-28 RX ORDER — OXYCODONE HYDROCHLORIDE 5 MG/1
5 TABLET ORAL EVERY 4 HOURS PRN
Status: DISCONTINUED | OUTPATIENT
Start: 2022-02-28 | End: 2022-03-03 | Stop reason: HOSPADM

## 2022-02-28 RX ORDER — FENTANYL CITRATE 50 UG/ML
25 INJECTION, SOLUTION INTRAMUSCULAR; INTRAVENOUS
Status: DISCONTINUED | OUTPATIENT
Start: 2022-02-28 | End: 2022-02-28 | Stop reason: HOSPADM

## 2022-02-28 RX ORDER — ONDANSETRON 2 MG/ML
INJECTION INTRAMUSCULAR; INTRAVENOUS PRN
Status: DISCONTINUED | OUTPATIENT
Start: 2022-02-28 | End: 2022-02-28

## 2022-02-28 RX ORDER — GLYCOPYRROLATE 0.2 MG/ML
INJECTION, SOLUTION INTRAMUSCULAR; INTRAVENOUS PRN
Status: DISCONTINUED | OUTPATIENT
Start: 2022-02-28 | End: 2022-02-28

## 2022-02-28 RX ORDER — LIDOCAINE HYDROCHLORIDE 20 MG/ML
INJECTION, SOLUTION INFILTRATION; PERINEURAL PRN
Status: DISCONTINUED | OUTPATIENT
Start: 2022-02-28 | End: 2022-02-28

## 2022-02-28 RX ORDER — HYDROMORPHONE HYDROCHLORIDE 1 MG/ML
0.2 INJECTION, SOLUTION INTRAMUSCULAR; INTRAVENOUS; SUBCUTANEOUS EVERY 5 MIN PRN
Status: DISCONTINUED | OUTPATIENT
Start: 2022-02-28 | End: 2022-02-28 | Stop reason: HOSPADM

## 2022-02-28 RX ORDER — LIDOCAINE 40 MG/G
CREAM TOPICAL
Status: DISCONTINUED | OUTPATIENT
Start: 2022-02-28 | End: 2022-03-03 | Stop reason: HOSPADM

## 2022-02-28 RX ORDER — DIPHENHYDRAMINE HYDROCHLORIDE 50 MG/ML
25 INJECTION INTRAMUSCULAR; INTRAVENOUS EVERY 6 HOURS PRN
Status: DISCONTINUED | OUTPATIENT
Start: 2022-02-28 | End: 2022-03-03 | Stop reason: HOSPADM

## 2022-02-28 RX ORDER — FENTANYL CITRATE 50 UG/ML
INJECTION, SOLUTION INTRAMUSCULAR; INTRAVENOUS PRN
Status: DISCONTINUED | OUTPATIENT
Start: 2022-02-28 | End: 2022-02-28

## 2022-02-28 RX ORDER — AMOXICILLIN 250 MG
1 CAPSULE ORAL 2 TIMES DAILY
Status: DISCONTINUED | OUTPATIENT
Start: 2022-02-28 | End: 2022-03-03 | Stop reason: HOSPADM

## 2022-02-28 RX ORDER — HEPARIN SODIUM 5000 [USP'U]/.5ML
5000 INJECTION, SOLUTION INTRAVENOUS; SUBCUTANEOUS EVERY 8 HOURS
Status: DISCONTINUED | OUTPATIENT
Start: 2022-03-01 | End: 2022-03-03 | Stop reason: HOSPADM

## 2022-02-28 RX ORDER — NALOXONE HYDROCHLORIDE 1 MG/ML
0.2 INJECTION INTRAMUSCULAR; INTRAVENOUS; SUBCUTANEOUS
Status: DISCONTINUED | OUTPATIENT
Start: 2022-02-28 | End: 2022-03-03 | Stop reason: HOSPADM

## 2022-02-28 RX ORDER — PROPOFOL 10 MG/ML
INJECTION, EMULSION INTRAVENOUS PRN
Status: DISCONTINUED | OUTPATIENT
Start: 2022-02-28 | End: 2022-02-28

## 2022-02-28 RX ORDER — DIPHENHYDRAMINE HCL 25 MG
25 TABLET ORAL EVERY 6 HOURS PRN
Status: DISCONTINUED | OUTPATIENT
Start: 2022-02-28 | End: 2022-03-03 | Stop reason: HOSPADM

## 2022-02-28 RX ORDER — ONDANSETRON 2 MG/ML
4 INJECTION INTRAMUSCULAR; INTRAVENOUS EVERY 30 MIN PRN
Status: DISCONTINUED | OUTPATIENT
Start: 2022-02-28 | End: 2022-02-28 | Stop reason: HOSPADM

## 2022-02-28 RX ORDER — MAGNESIUM HYDROXIDE 1200 MG/15ML
LIQUID ORAL PRN
Status: DISCONTINUED | OUTPATIENT
Start: 2022-02-28 | End: 2022-02-28 | Stop reason: HOSPADM

## 2022-02-28 RX ORDER — MULTIPLE VITAMINS W/ MINERALS TAB 9MG-400MCG
1 TAB ORAL DAILY
COMMUNITY

## 2022-02-28 RX ORDER — PROPOFOL 10 MG/ML
INJECTION, EMULSION INTRAVENOUS CONTINUOUS PRN
Status: DISCONTINUED | OUTPATIENT
Start: 2022-02-28 | End: 2022-02-28

## 2022-02-28 RX ORDER — MEPERIDINE HYDROCHLORIDE 25 MG/ML
12.5 INJECTION INTRAMUSCULAR; INTRAVENOUS; SUBCUTANEOUS
Status: DISCONTINUED | OUTPATIENT
Start: 2022-02-28 | End: 2022-02-28 | Stop reason: HOSPADM

## 2022-02-28 RX ORDER — BUPIVACAINE HYDROCHLORIDE 2.5 MG/ML
INJECTION, SOLUTION EPIDURAL; INFILTRATION; INTRACAUDAL PRN
Status: DISCONTINUED | OUTPATIENT
Start: 2022-02-28 | End: 2022-02-28

## 2022-02-28 RX ADMIN — BUPIVACAINE HYDROCHLORIDE 20 ML: 2.5 INJECTION, SOLUTION EPIDURAL; INFILTRATION; INTRACAUDAL at 13:30

## 2022-02-28 RX ADMIN — PHENYLEPHRINE HYDROCHLORIDE 100 MCG: 10 INJECTION INTRAVENOUS at 14:04

## 2022-02-28 RX ADMIN — ONDANSETRON 4 MG: 2 INJECTION INTRAMUSCULAR; INTRAVENOUS at 16:58

## 2022-02-28 RX ADMIN — GABAPENTIN 100 MG: 100 CAPSULE ORAL at 20:37

## 2022-02-28 RX ADMIN — BUPIVACAINE 20 ML: 13.3 INJECTION, SUSPENSION, LIPOSOMAL INFILTRATION at 13:30

## 2022-02-28 RX ADMIN — ACETAMINOPHEN 975 MG: 325 TABLET ORAL at 12:07

## 2022-02-28 RX ADMIN — ROCURONIUM BROMIDE 50 MG: 50 INJECTION, SOLUTION INTRAVENOUS at 13:26

## 2022-02-28 RX ADMIN — ONDANSETRON 4 MG: 2 INJECTION INTRAMUSCULAR; INTRAVENOUS at 16:05

## 2022-02-28 RX ADMIN — FENTANYL CITRATE 100 MCG: 50 INJECTION, SOLUTION INTRAMUSCULAR; INTRAVENOUS at 13:24

## 2022-02-28 RX ADMIN — DOCUSATE SODIUM AND SENNOSIDES 1 TABLET: 8.6; 5 TABLET ORAL at 20:36

## 2022-02-28 RX ADMIN — ROCURONIUM BROMIDE 20 MG: 50 INJECTION, SOLUTION INTRAVENOUS at 15:16

## 2022-02-28 RX ADMIN — SODIUM CHLORIDE, POTASSIUM CHLORIDE, SODIUM LACTATE AND CALCIUM CHLORIDE: 600; 310; 30; 20 INJECTION, SOLUTION INTRAVENOUS at 20:33

## 2022-02-28 RX ADMIN — PHENYLEPHRINE HYDROCHLORIDE 100 MCG: 10 INJECTION INTRAVENOUS at 15:25

## 2022-02-28 RX ADMIN — DEXAMETHASONE SODIUM PHOSPHATE 10 MG: 10 INJECTION, SOLUTION INTRAMUSCULAR; INTRAVENOUS at 13:39

## 2022-02-28 RX ADMIN — GLYCOPYRROLATE 0.1 MG: 0.2 INJECTION, SOLUTION INTRAMUSCULAR; INTRAVENOUS at 14:03

## 2022-02-28 RX ADMIN — ROCURONIUM BROMIDE 20 MG: 50 INJECTION, SOLUTION INTRAVENOUS at 14:13

## 2022-02-28 RX ADMIN — SODIUM CHLORIDE, POTASSIUM CHLORIDE, SODIUM LACTATE AND CALCIUM CHLORIDE: 600; 310; 30; 20 INJECTION, SOLUTION INTRAVENOUS at 18:03

## 2022-02-28 RX ADMIN — PHENYLEPHRINE HYDROCHLORIDE 100 MCG: 10 INJECTION INTRAVENOUS at 15:50

## 2022-02-28 RX ADMIN — PHENYLEPHRINE HYDROCHLORIDE 100 MCG: 10 INJECTION INTRAVENOUS at 15:13

## 2022-02-28 RX ADMIN — MAGNESIUM SULFATE HEPTAHYDRATE 4 G: 80 INJECTION, SOLUTION INTRAVENOUS at 12:03

## 2022-02-28 RX ADMIN — GLYCOPYRROLATE 0.1 MG: 0.2 INJECTION, SOLUTION INTRAMUSCULAR; INTRAVENOUS at 14:04

## 2022-02-28 RX ADMIN — MIDAZOLAM 2 MG: 1 INJECTION INTRAMUSCULAR; INTRAVENOUS at 13:20

## 2022-02-28 RX ADMIN — PHENYLEPHRINE HYDROCHLORIDE 100 MCG: 10 INJECTION INTRAVENOUS at 15:52

## 2022-02-28 RX ADMIN — PHENYLEPHRINE HYDROCHLORIDE 100 MCG: 10 INJECTION INTRAVENOUS at 14:58

## 2022-02-28 RX ADMIN — SUGAMMADEX 200 MG: 100 INJECTION, SOLUTION INTRAVENOUS at 16:26

## 2022-02-28 RX ADMIN — SODIUM CHLORIDE, POTASSIUM CHLORIDE, SODIUM LACTATE AND CALCIUM CHLORIDE: 600; 310; 30; 20 INJECTION, SOLUTION INTRAVENOUS at 14:49

## 2022-02-28 RX ADMIN — LIDOCAINE HYDROCHLORIDE 60 MG: 20 INJECTION, SOLUTION INFILTRATION; PERINEURAL at 13:24

## 2022-02-28 RX ADMIN — PROPOFOL 200 MG: 10 INJECTION, EMULSION INTRAVENOUS at 13:24

## 2022-02-28 RX ADMIN — FENTANYL CITRATE 25 MCG: 50 INJECTION, SOLUTION INTRAMUSCULAR; INTRAVENOUS at 17:34

## 2022-02-28 RX ADMIN — KETOROLAC TROMETHAMINE 15 MG: 30 INJECTION, SOLUTION INTRAMUSCULAR at 16:05

## 2022-02-28 RX ADMIN — SODIUM CHLORIDE, POTASSIUM CHLORIDE, SODIUM LACTATE AND CALCIUM CHLORIDE: 600; 310; 30; 20 INJECTION, SOLUTION INTRAVENOUS at 12:03

## 2022-02-28 RX ADMIN — FENTANYL CITRATE 25 MCG: 50 INJECTION, SOLUTION INTRAMUSCULAR; INTRAVENOUS at 17:00

## 2022-02-28 RX ADMIN — HYDROMORPHONE HYDROCHLORIDE 0.5 MG: 1 INJECTION, SOLUTION INTRAMUSCULAR; INTRAVENOUS; SUBCUTANEOUS at 14:44

## 2022-02-28 RX ADMIN — PROPOFOL 200 MCG/KG/MIN: 10 INJECTION, EMULSION INTRAVENOUS at 13:24

## 2022-02-28 RX ADMIN — FAMOTIDINE 20 MG: 10 INJECTION, SOLUTION INTRAVENOUS at 20:37

## 2022-02-28 RX ADMIN — PHENYLEPHRINE HYDROCHLORIDE 100 MCG: 10 INJECTION INTRAVENOUS at 14:52

## 2022-02-28 RX ADMIN — ACETAMINOPHEN 975 MG: 325 TABLET ORAL at 20:36

## 2022-02-28 RX ADMIN — GLYCOPYRROLATE 0.2 MG: 0.2 INJECTION, SOLUTION INTRAMUSCULAR; INTRAVENOUS at 13:24

## 2022-02-28 RX ADMIN — CEFAZOLIN SODIUM 2 G: 2 INJECTION, SOLUTION INTRAVENOUS at 13:24

## 2022-02-28 ASSESSMENT — ACTIVITIES OF DAILY LIVING (ADL)
ADLS_ACUITY_SCORE: 12

## 2022-02-28 ASSESSMENT — LIFESTYLE VARIABLES: TOBACCO_USE: 1

## 2022-02-28 NOTE — PHARMACY-ADMISSION MEDICATION HISTORY
Pharmacy Note - Admission Medication History    Pertinent Provider Information: Patient completed antibiotic prep with Flagyl and neomycin yesterday as instructed.  ______________________________________________________________________    Prior To Admission (PTA) med list completed and updated in EMR.       PTA Med List   Medication Sig Last Dose     metroNIDAZOLE (FLAGYL) 500 MG tablet Take 2 tablets po tid the day before your surgery. 2/27/2022     multivitamin w/minerals (THERA-VIT-M) tablet Take 1 tablet by mouth daily Past Week     neomycin (MYCIFRADIN) 500 MG tablet Take 2 tablets po tid the day before your surgery. 2/27/2022       Information source(s): Patient, Clinic records and [facility]    Method of interview communication: in-person    Patient was asked about OTC/herbal products specifically.  PTA med list reflects this.    Based on the pharmacist's assessment, the PTA med list information appears reliable    Allergies were reviewed, assessed, and updated with the patient.      Patient does not use any multi-dose medications prior to admission.     Thank you for the opportunity to participate in the care of this patient.      Claudio Ramirez ADALBERTO     2/28/2022     12:00 PM

## 2022-02-28 NOTE — INTERVAL H&P NOTE
I have reviewed the surgical (or preoperative) H&P that is linked to this encounter, and examined the patient. There are no significant changes.          Jonathan Rosales MD  General Surgery [phone]  Vascular Surgery [phone]

## 2022-02-28 NOTE — ANESTHESIA PROCEDURE NOTES
Airway       Patient location during procedure: OR       Procedure Start/Stop Times: 2/28/2022 1:28 PM  Staff -        Anesthesiologist:  Slim Yu MD       CRNA: Ness Donohue APRN CRNA       Performed By: CRNA  Consent for Airway        Urgency: elective  Indications and Patient Condition       Indications for airway management: colette-procedural       Induction type:intravenous       Mask difficulty assessment: 1 - vent by mask    Final Airway Details       Final airway type: endotracheal airway       Successful airway: ETT - single and Oral  Endotracheal Airway Details        ETT size (mm): 8.0       Cuffed: yes       Successful intubation technique: direct laryngoscopy       DL Blade Type: MAC 4       Grade View of Cords: 1       Adjucts: stylet and tooth guard       Position: Right       Measured from: gums/teeth       Secured at (cm): 24       Bite block used: None    Post intubation assessment        Placement verified by: capnometry, equal breath sounds and chest rise        Number of attempts at approach: 1       Number of other approaches attempted: 0       Ease of procedure: easy       Dentition: Intact and Unchanged

## 2022-02-28 NOTE — ANESTHESIA PREPROCEDURE EVALUATION
Anesthesia Pre-Procedure Evaluation    Patient: [place]   MRN: [id number] : 1985        Procedure : Procedure(s):  COLECTOMY, LEFT, LAPAROSCOPIC          Past Medical History:   Diagnosis Date     Diverticulitis      NO ACTIVE PROBLEMS      Overweight      Umbilical hernia       Past Surgical History:   Procedure Laterality Date     NO HISTORY OF SURGERY        No Known Allergies   Social History     Tobacco Use     Smoking status: Former Smoker     Packs/day: 0.50     Types: Cigarettes     Quit date: 2022     Years since quittin.1     Smokeless tobacco: Never Used   Substance Use Topics     Alcohol use: No     Alcohol/week: 0.0 standard drinks      Wt Readings from Last 1 Encounters:   22 93 kg (205 lb)        Anesthesia Evaluation            ROS/MED HX  ENT/Pulmonary:  - neg pulmonary ROS   (+) tobacco use, Past use,     Neurologic:  - neg neurologic ROS     Cardiovascular:  - neg cardiovascular ROS     METS/Exercise Tolerance:     Hematologic:  - neg hematologic  ROS     Musculoskeletal:  - neg musculoskeletal ROS     GI/Hepatic: Comment: diverticulitis - neg GI/hepatic ROS     Renal/Genitourinary:  - neg Renal ROS     Endo:  - neg endo ROS     Psychiatric/Substance Use:  - neg psychiatric ROS     Infectious Disease:  - neg infectious disease ROS     Malignancy:  - neg malignancy ROS     Other:  - neg other ROS          Physical Exam    Airway        Mallampati: II   TM distance: > 3 FB   Neck ROM: full     Respiratory Devices and Support         Dental       (+) chipped and missing    B=Bridge, C=Chipped, L=Loose, M=Missing    Cardiovascular   cardiovascular exam normal          Pulmonary   pulmonary exam normal                OUTSIDE LABS:  CBC:   Lab Results   Component Value Date    WBC 9.6 2021    WBC 12.8 (H) 2021    HGB 15.9 2021    HGB 16.5 2021    HCT 46.1 2021    HCT 48.4 2021     2021     2021     BMP:   Lab  Results   Component Value Date     08/13/2021     08/12/2021    POTASSIUM 3.8 08/13/2021    POTASSIUM 3.7 08/12/2021    CHLORIDE 107 08/13/2021    CHLORIDE 110 (H) 08/12/2021    CO2 23 08/13/2021    CO2 23 08/12/2021    BUN 6 (L) 08/13/2021    BUN 7 (L) 08/12/2021    CR 0.82 08/13/2021    CR 0.86 08/12/2021     08/13/2021     08/12/2021     COAGS:   Lab Results   Component Value Date    PTT 36 08/12/2021    INR 1.00 08/12/2021     POC: No results found for: BGM, HCG, HCGS  HEPATIC:   Lab Results   Component Value Date    ALBUMIN 3.3 (L) 08/13/2021    PROTTOTAL 7.4 08/13/2021    ALT 30 08/13/2021    AST 21 08/13/2021    ALKPHOS 123 (H) 08/13/2021    BILITOTAL 1.3 (H) 08/13/2021     OTHER:   Lab Results   Component Value Date    FRANCHESCA 9.3 08/13/2021    LIPASE <9 08/12/2021       Anesthesia Plan    ASA Status:  2   NPO Status:  NPO Appropriate    Anesthesia Type: General.     - Airway: ETT   Induction: Propofol, Intravenous.   Maintenance: TIVA.        Consents    Anesthesia Plan(s) and associated risks, benefits, and realistic alternatives discussed. Questions answered and patient/representative(s) expressed understanding.     - Discussed: Risks, Benefits and Alternatives for the PROCEDURE were discussed     - Discussed with:  Patient         Postoperative Care    Pain management: Multi-modal analgesia, Peripheral nerve block (Single Shot).   PONV prophylaxis: Ondansetron (or other 5HT-3), Dexamethasone or Solumedrol     Comments:    Other Comments: Reviewed anesthetic options and risks, including risk of dental trauma. Patient agrees to proceed.     Recent Results (from the past 120 hour(s))  -Asymptomatic COVID-19 Virus (Coronavirus) by PCR Nose:   Collection Time: 02/24/22  4:08 PM  Specimen: Nose; Swab       Result                                            Value                         Ref Range                       SARS CoV2 PCR                                     Negative                       Negative, Testing sent t*            Slim Yu MD

## 2022-02-28 NOTE — ANESTHESIA CARE TRANSFER NOTE
Patient: [place] Y[place]    Procedure: Procedure(s):  laparoscopic SIGMOID COLECTOMY, LEFT, and umbilical hernia repair       Diagnosis: Diverticulitis of colon [K57.32]  Diagnosis Additional Information: No value filed.    Anesthesia Type:   General     Note:    Oropharynx: oropharynx clear of all foreign objects  Level of Consciousness: drowsy  Oxygen Supplementation: face mask  Level of Supplemental Oxygen (L/min / FiO2): 8  Independent Airway: airway patency satisfactory and stable  Dentition: dentition unchanged  Vital Signs Stable: post-procedure vital signs reviewed and stable  Report to RN Given: handoff report given  Patient transferred to: PACU    Handoff Report: Identifed the Patient, Identified the Reponsible Provider, Reviewed the pertinent medical history, Discussed the surgical course, Reviewed Intra-OP anesthesia mangement and issues during anesthesia, Set expectations for post-procedure period and Allowed opportunity for questions and acknowledgement of understanding      Vitals:  Vitals Value Taken Time   /68    Temp 37.1  C (98.7  F) 02/28/22 1637   Pulse 73 02/28/22 1637   Resp 16 02/28/22 1637   SpO2 100 % 02/28/22 1637       Electronically Signed By: BUSHRA [place] CRNA  February 28, 2022  4:40 PM

## 2022-02-28 NOTE — OP NOTE
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  I resected the fistula and oversewed this on the small bowel the large bowel segment was removed and a primary anastomosis with an EEA stapler was performed.   Complications: None.   Condition: Stable             Description of procedure:  Consent was obtained.  Taken to the operating placed in supine position.  General endotracheal anesthesia was administered by the anesthesia staff.  A Romero was placed and he then put in the lithotomy position his abdomen and perirectal area and perineum were prepped and draped in sterile manner.  We did a briefing and timeout with anesthesia and or staff.  I this time point began the procedure by making incision inferior to the umbilicus 5 L scope was used to gain access to peritoneal cavity insufflated to pressure 15 mmHg CO2.  Upon doing this I placed another 5 mm about 3 cm inferior to this another 5 mm superior to the umbilicus.  At this time point we just to examine the abdomen found that there was a small bowel fistula to the large bowel easily seen and picked up.  Began by taking down the line of Toldt all the way up to the splenic flexure with a [place] LigaSure device.  I then freed up the  bowel into the area and there are multiple can of abscesses cavities from prior infections against the left lateral wall and these were meticulously dissected with of the LigaSure device and also sharp and blunt dissection.  I was able to free this all up and down in the to the rectum down the pelvis and could easily see where there is no more disease.  I then switched medially to this area where the unfortunately the small bowel fistula to the abscess cavity and/or colon was easily seen we took this down sharply away from the abscess cavity leaving the small bowel intact and were able to free this up there is no leak of succus or actually drainage this clot this fistula had been out now closed was an old tract.  We took this to side for further definitive repair once we opened up to place the staple EEA stapler and the later on.  I then cut this time point secondary to the large amount of the abscess area in the retroperitoneum decided to take the bowel down from good bowel down to the pelvis to the bad bowel and what I did here as I placed my lower stapler changed out to a 12 mm I came across proximally at that where no disease was encountered which is the the descending colon.  I then used the LigaSure device to take the mesentery and the abscess cavity old abscess cavities out I did this all the way down in the pelvic region and at this time point came across the MACARIO 60 stapler.  I did use 2 loads for this.  I then did a free segment of sigmoid colon I placed this down to the pelvis off to the lateral side and at this time point and then tested my length of my: Meaning the descending colon if it would reach down into the pelvic area it appeared to do so with a little bit more dissection up and freely mobile a mobilize into the midline a little bit more of the splenic and little descending colon.  I at this time point Tabbie Day sized size of the rectum and easily sized up to 33 so the the the issues would be the  rectum and I also had a separate sizers up on the field looking at the large bowel will most likely we will only get to be Jax 29.  At this time point I extend my to lower incisions together making 1 went to the fashion into the peritoneal cavity.  Odin retractor was placed through this and could easily see my specimen this was brought up through this incision and and given off for pathology.  We at this time point could easily see the small bowel distal which is brought up to the incision to I placed a silk suture Lembert into this and clamp this okay keep track of it I then brought the small bowel up to the incision where the enterotomy the old fistula had been.  There is no enterotomy at this time point but what I did is a Ileu was Lembert sutures in a silk with silks to invert this and this was done over the area of the involvement or which is small about 2 cm.  Upon completing this we placed a ball back into the intra-abdominal he.  I then used the sizers to look at the bowel again I actually with the bowel and 33 was good to be too large of a anastomosis which was a 2829 stapler EEA I made a small enterotomy in the distal bowel and I used push the anvil through this closed over out with a pursestring with a 3-0 Prolene suture.  Tightly around the anvil.  This is displaced intra-abdominally I twisted my Odin retractor closed I then placed back the 5 mm trocar above the umbilicus and insufflated space to pressure 15 mmHg of CO2 I placed 2 more 5 trochars one on the right and on the left of the abdomen.  And then this time point Ella Harris was down below in the lower position brought the stapler up through the rectum and easily can be seen in her field I brought the anvil down to the stapler after she extended this this engaged and was retracted went into the good position and tightness in the staple was fired.  She remove the anvil intact with 2 rings.  I then filled the pelvis with fluid and I occluded  across in the area prior of the where the sigmoid was she insufflated this up with a proctoscopy proctoscope and this did not leak looked good from our standpoint.  I then remove the irrigation and irrigate out the pelvis a liter normal saline solution.  I placed drain through the right lateral port down to the pelvis 15 Kevyn.  And we then switched over to a closing tray.  All the trochars and CO2 and gas removed  We went to a closing tray and at this time point this is draped off new cautery new like gloves and we at this time point closed the fascial defect femoral access port with a 0 PDS looped proceed suture once this was closed irrigated the incision on this was closed with staples I sutured the drain into place with a silk suture I then at this time point had an umbilical hernia which is right next to my incision extended slightly and we resected the reduce this to fatty to small he defect which is about 3 or 4 mm in diameter the fat back inside and I used a figure-of-eight 0 Ethibond suture to close this.  I then closed this incision with a 4-0 Monocryl in the left incision with a 4-0 Monocryl subcuticular stitch.  Steri-Strip sterile dressings applied of these 2 wounds sterile dressing was applied to the midline wound and I drain sponge was placed in the drain area.  All sponge needle counts were correct.  Patient was extubated transferred to PACU in stable condition.        Jonathan Rosales MD  General Surgery [phone]  Vascular Surgery [phone]

## 2022-03-01 LAB
ALBUMIN SERPL-MCNC: 3.2 G/DL (ref 3.5–5)
ALP SERPL-CCNC: 83 U/L (ref 45–120)
ALT SERPL W P-5'-P-CCNC: 30 U/L (ref 0–45)
ANION GAP SERPL CALCULATED.3IONS-SCNC: 8 MMOL/L (ref 5–18)
AST SERPL W P-5'-P-CCNC: 17 U/L (ref 0–40)
BILIRUB SERPL-MCNC: 0.6 MG/DL (ref 0–1)
BUN SERPL-MCNC: 12 MG/DL (ref 8–22)
CALCIUM SERPL-MCNC: 8.1 MG/DL (ref 8.5–10.5)
CHLORIDE BLD-SCNC: 107 MMOL/L (ref 98–107)
CO2 SERPL-SCNC: 22 MMOL/L (ref 22–31)
CREAT SERPL-MCNC: 0.92 MG/DL (ref 0.7–1.3)
ERYTHROCYTE [DISTWIDTH] IN BLOOD BY AUTOMATED COUNT: 11.9 % (ref 10–15)
GFR SERPL CREATININE-BSD FRML MDRD: >90 ML/MIN/1.73M2
GLUCOSE BLD-MCNC: 128 MG/DL (ref 70–125)
HCT VFR BLD AUTO: 40.5 % (ref 40–53)
HGB BLD-MCNC: 13.7 G/DL (ref 13.3–17.7)
MCH RBC QN AUTO: 30.9 PG (ref 26.5–33)
MCHC RBC AUTO-ENTMCNC: 33.8 G/DL (ref 31.5–36.5)
MCV RBC AUTO: 91 FL (ref 78–100)
PLATELET # BLD AUTO: 178 10E3/UL (ref 150–450)
POTASSIUM BLD-SCNC: 4.6 MMOL/L (ref 3.5–5)
PROT SERPL-MCNC: 6.1 G/DL (ref 6–8)
RBC # BLD AUTO: 4.43 10E6/UL (ref 4.4–5.9)
SODIUM SERPL-SCNC: 137 MMOL/L (ref 136–145)
WBC # BLD AUTO: 9.5 10E3/UL (ref 4–11)

## 2022-03-01 PROCEDURE — 120N000001 HC R&B MED SURG/OB

## 2022-03-01 PROCEDURE — 85027 COMPLETE CBC AUTOMATED: CPT | Performed by: SPECIALIST

## 2022-03-01 PROCEDURE — 258N000003 HC RX IP 258 OP 636: Performed by: SPECIALIST

## 2022-03-01 PROCEDURE — 82040 ASSAY OF SERUM ALBUMIN: CPT | Performed by: SPECIALIST

## 2022-03-01 PROCEDURE — 36415 COLL VENOUS BLD VENIPUNCTURE: CPT | Performed by: SPECIALIST

## 2022-03-01 PROCEDURE — 250N000011 HC RX IP 250 OP 636: Performed by: SPECIALIST

## 2022-03-01 PROCEDURE — 80053 COMPREHEN METABOLIC PANEL: CPT | Performed by: SPECIALIST

## 2022-03-01 PROCEDURE — 250N000013 HC RX MED GY IP 250 OP 250 PS 637: Performed by: SPECIALIST

## 2022-03-01 PROCEDURE — 99024 POSTOP FOLLOW-UP VISIT: CPT | Performed by: SPECIALIST

## 2022-03-01 RX ADMIN — OXYCODONE HYDROCHLORIDE 5 MG: 5 TABLET ORAL at 16:21

## 2022-03-01 RX ADMIN — SODIUM CHLORIDE, POTASSIUM CHLORIDE, SODIUM LACTATE AND CALCIUM CHLORIDE: 600; 310; 30; 20 INJECTION, SOLUTION INTRAVENOUS at 03:59

## 2022-03-01 RX ADMIN — HEPARIN SODIUM 5000 UNITS: 5000 INJECTION, SOLUTION INTRAVENOUS; SUBCUTANEOUS at 15:14

## 2022-03-01 RX ADMIN — ACETAMINOPHEN 975 MG: 325 TABLET ORAL at 03:53

## 2022-03-01 RX ADMIN — OXYCODONE HYDROCHLORIDE 5 MG: 5 TABLET ORAL at 08:16

## 2022-03-01 RX ADMIN — KETOROLAC TROMETHAMINE 15 MG: 30 INJECTION, SOLUTION INTRAMUSCULAR at 05:28

## 2022-03-01 RX ADMIN — GABAPENTIN 100 MG: 100 CAPSULE ORAL at 08:16

## 2022-03-01 RX ADMIN — ACETAMINOPHEN 975 MG: 325 TABLET ORAL at 20:37

## 2022-03-01 RX ADMIN — HEPARIN SODIUM 5000 UNITS: 5000 INJECTION, SOLUTION INTRAVENOUS; SUBCUTANEOUS at 21:45

## 2022-03-01 RX ADMIN — ACETAMINOPHEN 975 MG: 325 TABLET ORAL at 12:00

## 2022-03-01 RX ADMIN — FAMOTIDINE 20 MG: 20 TABLET, FILM COATED ORAL at 20:37

## 2022-03-01 RX ADMIN — DOCUSATE SODIUM AND SENNOSIDES 1 TABLET: 8.6; 5 TABLET ORAL at 08:16

## 2022-03-01 RX ADMIN — SODIUM CHLORIDE, POTASSIUM CHLORIDE, SODIUM LACTATE AND CALCIUM CHLORIDE: 600; 310; 30; 20 INJECTION, SOLUTION INTRAVENOUS at 10:45

## 2022-03-01 RX ADMIN — GABAPENTIN 100 MG: 100 CAPSULE ORAL at 20:37

## 2022-03-01 RX ADMIN — KETOROLAC TROMETHAMINE 15 MG: 30 INJECTION, SOLUTION INTRAMUSCULAR at 12:01

## 2022-03-01 RX ADMIN — KETOROLAC TROMETHAMINE 15 MG: 30 INJECTION, SOLUTION INTRAMUSCULAR at 00:51

## 2022-03-01 RX ADMIN — GABAPENTIN 100 MG: 100 CAPSULE ORAL at 15:13

## 2022-03-01 RX ADMIN — SODIUM CHLORIDE, POTASSIUM CHLORIDE, SODIUM LACTATE AND CALCIUM CHLORIDE: 600; 310; 30; 20 INJECTION, SOLUTION INTRAVENOUS at 18:36

## 2022-03-01 RX ADMIN — FAMOTIDINE 20 MG: 20 TABLET, FILM COATED ORAL at 08:16

## 2022-03-01 ASSESSMENT — ACTIVITIES OF DAILY LIVING (ADL)
ADLS_ACUITY_SCORE: 3
ADLS_ACUITY_SCORE: 3
TOILETING_ISSUES: NO
ADLS_ACUITY_SCORE: 3
CONCENTRATING,_REMEMBERING_OR_MAKING_DECISIONS_DIFFICULTY: NO
ADLS_ACUITY_SCORE: 12
ADLS_ACUITY_SCORE: 3
DOING_ERRANDS_INDEPENDENTLY_DIFFICULTY: NO
ADLS_ACUITY_SCORE: 3
ADLS_ACUITY_SCORE: 3
DRESSING/BATHING_DIFFICULTY: NO
ADLS_ACUITY_SCORE: 3
WEAR_GLASSES_OR_BLIND: NO
ADLS_ACUITY_SCORE: 3
DIFFICULTY_EATING/SWALLOWING: NO
ADLS_ACUITY_SCORE: 3
HEARING_DIFFICULTY_OR_DEAF: NO
ADLS_ACUITY_SCORE: 3
WALKING_OR_CLIMBING_STAIRS_DIFFICULTY: NO
ADLS_ACUITY_SCORE: 3
FALL_HISTORY_WITHIN_LAST_SIX_MONTHS: NO
DIFFICULTY_COMMUNICATING: NO
ADLS_ACUITY_SCORE: 3

## 2022-03-01 NOTE — ANESTHESIA POSTPROCEDURE EVALUATION
Patient: [place] Y[place]    Procedure: Procedure(s):  laparoscopic SIGMOID COLECTOMY, LEFT, and umbilical hernia repair       Anesthesia Type:  General    Note:  Disposition: Inpatient   Postop Pain Control: Uneventful            Sign Out: Well controlled pain   PONV: No   Neuro/Psych: Uneventful            Sign Out: Acceptable/Baseline neuro status   Airway/Respiratory: Uneventful            Sign Out: Acceptable/Baseline resp. status   CV/Hemodynamics: Uneventful            Sign Out: Acceptable CV status; No obvious hypovolemia; No obvious fluid overload   Other NRE: NONE   DID A NON-ROUTINE EVENT OCCUR? No           Last vitals:  Vitals Value Taken Time   /85 02/28/22 1815   Temp 37.1  C (98.8  F) 02/28/22 1815   Pulse 81 02/28/22 1828   Resp 15 02/28/22 1823   SpO2 95 % 02/28/22 1828   Vitals shown include unvalidated device data.    Electronically Signed By: Angus Florez MD  February 28, 2022  9:21 PM

## 2022-03-01 NOTE — PLAN OF CARE
Vital signs are stable. Patient received his scheduled tylenol and Toradol for pain in his abdominal and rectal area. Up to the bathroom couple of times. Patient attempted to poop but bloody drainage ( some clots) from the rectal area were produced while urinating. J.P intact, patent and draining. 40 ml output (red color drainage). Abdominal surgical site intact with dressing. Patient did walked around the unit without dizziness. NPO. Continuous IV fluid LR @ 125. Continue to monitor.                                                                                                                                                                                                                                                                                                                                                                   Problem: Plan of Care - These are the overarching goals to be used throughout the patient stay.    Goal: Plan of Care Review/Shift Note  Description: The Plan of Care Review/Shift note should be completed every shift.  The Outcome Evaluation is a brief statement about your assessment that the patient is improving, declining, or no change.  This information will be displayed automatically on your shift note.  Outcome: Ongoing, Progressing   Goal Outcome Evaluation:

## 2022-03-01 NOTE — PROGRESS NOTES
"ASSESSMENT:    Sigmoid diverticulitis  Small bowel the large bowel fistula    Nathaniel Ybarra is a 36 year old male who is s/p left colon resection and resection of small bowel large bowel fistula postop day 1    PLAN:  Clear liquid diet  Continue activity as he is doing continue drain and follow      SUBJECTIVE:   He is postop day 1 sigmoid colon resection.  He is up ambulating walking no troubles or issues last night he was walking had a little bit of count lightheadedness but is probably related to anesthesia now it is gone he is walking feels pretty good minimal pain he has had bunch of bowel movements actually 3 or 4 already most of them been bloody which is not unexpected considering he had a resection and reanastomosis.      Patient Vitals for the past 24 hrs:   BP Temp Temp src Pulse Resp SpO2 Height Weight   03/01/22 0717 (!) 141/90 98.4  F (36.9  C) Oral 105 18 96 % -- --   03/01/22 0340 115/73 98.8  F (37.1  C) Oral 85 17 94 % -- --   02/28/22 2333 111/70 98  F (36.7  C) Oral 89 18 94 % -- --   02/28/22 2236 118/76 -- -- 79 -- -- -- --   02/28/22 2233 129/65 -- -- 74 -- -- -- --   02/28/22 2200 131/61 -- Oral 102 20 95 % -- --   02/28/22 2030 (!) 141/84 97.8  F (36.6  C) Oral 100 20 94 % -- --   02/28/22 1930 (!) 143/91 98.6  F (37  C) Axillary 91 16 95 % -- --   02/28/22 1840 136/88 97.6  F (36.4  C) Oral 84 20 96 % 1.727 m (5' 8\") 91.7 kg (202 lb 1.6 oz)   02/28/22 1815 (!) 146/85 98.8  F (37.1  C) -- 81 17 95 % -- --   02/28/22 1800 132/81 -- -- 63 16 -- -- --   02/28/22 1745 121/82 -- -- 65 14 95 % -- --   02/28/22 1730 (!) 140/84 -- -- 58 18 97 % -- --   02/28/22 1715 (!) 139/90 -- -- 63 20 94 % -- --   02/28/22 1700 (!) 153/96 -- -- 61 17 100 % -- --   02/28/22 1645 129/81 -- -- 59 12 97 % -- --   02/28/22 1637 129/81 98.7  F (37.1  C) -- 73 16 100 % -- --   02/28/22 1138 (!) 134/96 97.7  F (36.5  C) Oral 75 18 97 % -- 93 kg (205 lb)         PHYSICAL EXAM:  GEN: No acute distress, comfortable  LUNGS: " CTA bilaterally  CV:RRR  ABD: soft, tender incisionally, tirso in place  Drains: 40 ml bloody tinged fluid  Output by Drain (mL) 02/27/22 0700 - 02/27/22 1459 02/27/22 1500 - 02/27/22 2259 02/27/22 2300 - 02/28/22 0659 02/28/22 0700 - 02/28/22 1459 02/28/22 1500 - 02/28/22 2259 02/28/22 2300 - 03/01/22 0659 03/01/22 0700 - 03/01/22 0837   Closed/Suction Drain Lateral RLQ Bulb 15 [place]     70  30      EXT:No cyanosis, edema or obvious abnormalities    02/28 0700 - 03/01 0659  In: 1900 [I.V.:1800]  Out: 1220 [Urine:1100; Drains:70]    Admission on 02/28/2022   Component Date Value     WBC Count 02/28/2022 10.4      RBC Count 02/28/2022 4.73      Hemoglobin 02/28/2022 14.6      Hematocrit 02/28/2022 42.7      MCV 02/28/2022 90      MCH 02/28/2022 30.9      MCHC 02/28/2022 34.2      RDW 02/28/2022 11.8      Platelet Count 02/28/2022 186      Sodium 03/01/2022 137      Potassium 03/01/2022 4.6      Chloride 03/01/2022 107      Carbon Dioxide (CO2) 03/01/2022 22      Anion Gap 03/01/2022 8      Urea Nitrogen 03/01/2022 12      Creatinine 03/01/2022 0.92      Calcium 03/01/2022 8.1 (A)     Glucose 03/01/2022 128 (A)     Alkaline Phosphatase 03/01/2022 83      AST 03/01/2022 17      ALT 03/01/2022 30      Protein Total 03/01/2022 6.1      Albumin 03/01/2022 3.2 (A)     Bilirubin Total 03/01/2022 0.6      GFR Estimate 03/01/2022 >90      WBC Count 03/01/2022 9.5      RBC Count 03/01/2022 4.43      Hemoglobin 03/01/2022 13.7      Hematocrit 03/01/2022 40.5      MCV 03/01/2022 91      MCH 03/01/2022 30.9      MCHC 03/01/2022 33.8      RDW 03/01/2022 11.9      Platelet Count 03/01/2022 178           Jonathan Rosales MD

## 2022-03-01 NOTE — PLAN OF CARE
Pt ambulated in [place] with assist of 2, walker and gait belt at 2200. Tolerated well. Used bathroom upon returning to room. Small amount of bright red blood from rectum when standing to urinate. Pt assisted back to bed, pt requested to sit on side of bed. A few min later, pt called out, still sitting at side of bed, but stated to RN that he either needed to vomit or he was going to faint. RN assisted pt to lay down in bed. Pt appeared pale and sweaty. Orthostatic vitals taken, see chart. Resident notified, ordered hgb. RN updated girlfriend, who called nurse's station. Pt back to baseline. Pt understands to call for help prior to getting oob.

## 2022-03-02 LAB
GLUCOSE BLDC GLUCOMTR-MCNC: 95 MG/DL (ref 70–99)
PATH REPORT.COMMENTS IMP SPEC: NORMAL
PATH REPORT.COMMENTS IMP SPEC: NORMAL
PATH REPORT.FINAL DX SPEC: NORMAL
PATH REPORT.GROSS SPEC: NORMAL
PATH REPORT.MICROSCOPIC SPEC OTHER STN: NORMAL
PATH REPORT.RELEVANT HX SPEC: NORMAL
PHOTO IMAGE: NORMAL

## 2022-03-02 PROCEDURE — 250N000011 HC RX IP 250 OP 636: Performed by: SPECIALIST

## 2022-03-02 PROCEDURE — 99024 POSTOP FOLLOW-UP VISIT: CPT | Performed by: SPECIALIST

## 2022-03-02 PROCEDURE — 120N000001 HC R&B MED SURG/OB

## 2022-03-02 PROCEDURE — 250N000013 HC RX MED GY IP 250 OP 250 PS 637: Performed by: SPECIALIST

## 2022-03-02 RX ADMIN — ACETAMINOPHEN 975 MG: 325 TABLET ORAL at 19:56

## 2022-03-02 RX ADMIN — FAMOTIDINE 20 MG: 20 TABLET, FILM COATED ORAL at 20:00

## 2022-03-02 RX ADMIN — OXYCODONE HYDROCHLORIDE 5 MG: 5 TABLET ORAL at 20:05

## 2022-03-02 RX ADMIN — HEPARIN SODIUM 5000 UNITS: 5000 INJECTION, SOLUTION INTRAVENOUS; SUBCUTANEOUS at 05:55

## 2022-03-02 RX ADMIN — ACETAMINOPHEN 975 MG: 325 TABLET ORAL at 04:30

## 2022-03-02 RX ADMIN — FAMOTIDINE 20 MG: 20 TABLET, FILM COATED ORAL at 09:50

## 2022-03-02 RX ADMIN — GABAPENTIN 100 MG: 100 CAPSULE ORAL at 14:07

## 2022-03-02 RX ADMIN — OXYCODONE HYDROCHLORIDE 5 MG: 5 TABLET ORAL at 14:07

## 2022-03-02 RX ADMIN — GABAPENTIN 100 MG: 100 CAPSULE ORAL at 20:00

## 2022-03-02 RX ADMIN — HEPARIN SODIUM 5000 UNITS: 5000 INJECTION, SOLUTION INTRAVENOUS; SUBCUTANEOUS at 14:44

## 2022-03-02 RX ADMIN — GABAPENTIN 100 MG: 100 CAPSULE ORAL at 09:50

## 2022-03-02 RX ADMIN — ACETAMINOPHEN 975 MG: 325 TABLET ORAL at 12:37

## 2022-03-02 RX ADMIN — HEPARIN SODIUM 5000 UNITS: 5000 INJECTION, SOLUTION INTRAVENOUS; SUBCUTANEOUS at 22:58

## 2022-03-02 ASSESSMENT — ACTIVITIES OF DAILY LIVING (ADL)
ADLS_ACUITY_SCORE: 3

## 2022-03-02 NOTE — PLAN OF CARE
Problem: Bleeding (Bowel Resection)  Goal: Absence of Bleeding  Outcome: Met     Problem: Bowel Motility Impaired (Bowel Resection)  Goal: Effective Bowel Motility and Elimination  Outcome: Met     Problem: Postoperative Nausea and Vomiting (Bowel Resection)  Goal: Nausea and Vomiting Relief  Outcome: Met     Problem: Pain (Bowel Resection)  Goal: Acceptable Pain Control  Outcome: Ongoing, Progressing  Intervention: Prevent or Manage Pain  Pain Management Interventions: medication (see MAR)     Goal Outcome Evaluation:    Sched tylenol for pain. Incision dressing has small old scant drainage. No new noted drainage. Pt has been having loose stools, refused sched senna tab. Pt ambulating in hallways okay

## 2022-03-02 NOTE — PROGRESS NOTES
Care Management Follow Up    Length of Stay (days): 2    Expected Discharge Date: 03/03/2022     Concerns to be Addressed:     POD #2, drain care, IV fluids, post-op progression, Surgery following  Patient plan of care discussed at interdisciplinary rounds: No    Anticipated Discharge Disposition: Home     Anticipated Discharge Services: None  Anticipated Discharge DME: None    Education Provided on the Discharge Plan:    Patient/Family in Agreement with the Plan: yes    Referrals Placed by CM/SW:  None at this time  Private pay costs discussed: Not applicable    Additional Information:  RNCM completed chart review.  Patint is POD # 2 progressing well.  Anticipating discharge to home tomorrow (3/3).  No CM needs anticipated.  CM remains available should any needs arise for discharge planning.      Lennie Coffman RN

## 2022-03-02 NOTE — PLAN OF CARE
Problem: Bleeding (Bowel Resection)  Goal: Absence of Bleeding  Outcome: Ongoing, Progressing    Problem: Bowel Motility Impaired (Bowel Resection)  Goal: Effective Bowel Motility and Elimination  Outcome: Ongoing, Progressing     Pt independent on unit, walking in halls every hour, tolerating well. Reports intermittent pain to abdomen, prn oxycodone given x 2 along with scheduled meds, pt reports helpful. Pt had 8 loose bowel movements since 0700. Bowel movements were bloody this morning, surgical team aware. Stools transitioned to maroon color this afternoon, and are now brown in color.Tolerating clear liquid diet, denies nausea. VIOLETA intact, bloody/serosanguinous drainage. Surgical dressing intact with small amount of old drainage. Using incentive spirometer at bedside. IV fluids running per orders. Girlfriend here visiting.

## 2022-03-03 VITALS
TEMPERATURE: 98.4 F | BODY MASS INDEX: 30.48 KG/M2 | OXYGEN SATURATION: 95 % | HEIGHT: 68 IN | DIASTOLIC BLOOD PRESSURE: 85 MMHG | SYSTOLIC BLOOD PRESSURE: 120 MMHG | RESPIRATION RATE: 16 BRPM | HEART RATE: 78 BPM | WEIGHT: 201.1 LBS

## 2022-03-03 LAB — PLATELET # BLD AUTO: 168 10E3/UL (ref 150–450)

## 2022-03-03 PROCEDURE — 85049 AUTOMATED PLATELET COUNT: CPT | Performed by: SPECIALIST

## 2022-03-03 PROCEDURE — 250N000011 HC RX IP 250 OP 636: Performed by: SPECIALIST

## 2022-03-03 PROCEDURE — 250N000013 HC RX MED GY IP 250 OP 250 PS 637: Performed by: SPECIALIST

## 2022-03-03 PROCEDURE — 99024 POSTOP FOLLOW-UP VISIT: CPT | Performed by: PHYSICIAN ASSISTANT

## 2022-03-03 PROCEDURE — 36415 COLL VENOUS BLD VENIPUNCTURE: CPT | Performed by: SPECIALIST

## 2022-03-03 RX ORDER — ACETAMINOPHEN 325 MG/1
975 TABLET ORAL EVERY 8 HOURS
COMMUNITY
Start: 2022-03-03 | End: 2022-09-20

## 2022-03-03 RX ORDER — OXYCODONE HYDROCHLORIDE 5 MG/1
5 TABLET ORAL EVERY 6 HOURS PRN
Qty: 12 TABLET | Refills: 0 | Status: SHIPPED | OUTPATIENT
Start: 2022-03-03 | End: 2022-09-02

## 2022-03-03 RX ORDER — AMOXICILLIN 250 MG
1 CAPSULE ORAL 2 TIMES DAILY
COMMUNITY
Start: 2022-03-03 | End: 2022-09-20

## 2022-03-03 RX ADMIN — GABAPENTIN 100 MG: 100 CAPSULE ORAL at 08:41

## 2022-03-03 RX ADMIN — FAMOTIDINE 20 MG: 20 TABLET, FILM COATED ORAL at 08:41

## 2022-03-03 RX ADMIN — ACETAMINOPHEN 975 MG: 325 TABLET ORAL at 05:02

## 2022-03-03 RX ADMIN — HEPARIN SODIUM 5000 UNITS: 5000 INJECTION, SOLUTION INTRAVENOUS; SUBCUTANEOUS at 05:04

## 2022-03-03 ASSESSMENT — ACTIVITIES OF DAILY LIVING (ADL)
ADLS_ACUITY_SCORE: 3

## 2022-03-03 NOTE — PROGRESS NOTES
Care Management Discharge Note    Discharge Date: 03/03/2022       Discharge Disposition: Home    Discharge Services: None    Discharge DME: None    Discharge Transportation: family or friend will provide    Private pay costs discussed: Not applicable    Education Provided on the Discharge Plan:  Yes, per care team  Persons Notified of Discharge Plans: patient  Patient/Family in Agreement with the Plan: yes    Handoff Referral Completed: Yes    Additional Information:  RNCM completed chart review.    Patient POD #3   s/p laparoscopic colon resection of small bowel takedown   Patient ready for discharge.  Per Surgery patient is tolerating general diet, ambulating without assistance, and pain is controlled with oral analgesics.  No Care Management (CM) needs for this hospitalization identified.    Lennie Coffman RN

## 2022-03-03 NOTE — PLAN OF CARE
Problem: Pain (Bowel Resection)  Goal: Acceptable Pain Control  Outcome: Ongoing, Progressing     Problem: Respiratory Compromise (Bowel Resection)  Goal: Effective Oxygenation and Ventilation  Outcome: Ongoing, Progressing     Problem: Postoperative Urinary Retention (Bowel Resection)  Goal: Effective Urinary Elimination  Outcome: Ongoing, Progressing     Problem: Bowel Motility Impaired (Bowel Resection)  Goal: Effective Bowel Motility and Elimination  Outcome: Ongoing, Progressing   Goal Outcome Evaluation:          Patient slept most of the night. Denies any pain or discomfort at rest. VSS. VIOLETA 15cc serous. Dressing dry and intact. Walked independently in the [place]roy.

## 2022-03-03 NOTE — PLAN OF CARE
Goal Outcome Evaluation: Tolerating general diet, voiding and stooling without difficulty.  Pain well controlled.  Independent with self care.Patient verbalizes understanding of discharge instructions, symptoms to report and follow up needed.           Overall Patient Progress: improving

## 2022-03-03 NOTE — DISCHARGE SUMMARY
Discharge Provider: Brad Artis PA-C  Admission Date: 2/28/2022  Discharge Date: 03/03/22      Primary Diagnosis at Discharge:   Patient Active Problem List   Diagnosis     Diverticulitis of colon     Colonic fistula     Sigmoid diverticulitis      Disposition: Home or Self Care  Condition at Discharge: Stable     Surgery: Colectomy    Hospital Summary:   Nathaniel Ybarra was admitted for Colectomy and underwent an colectomy.  Following a brief recovery in the PACU, he was transferred to the Med/Surg floor for the remainder of his stay.  his post operative course was uneventful, and his diet was advanced as tolerated.  On POD # 3 he was discharged home tolerating a general diet, ambulating without assistance, and pain controlled with oral analgesics.        Discharge Medications:      Review of your medicines      START taking      Dose / Directions   acetaminophen 325 MG tablet  Commonly known as: TYLENOL      Dose: 975 mg  Take 3 tablets (975 mg) by mouth every 8 hours  Refills: 0     oxyCODONE 5 MG tablet  Commonly known as: ROXICODONE      Dose: 5 mg  Take 1 tablet (5 mg) by mouth every 6 hours as needed  Quantity: 12 tablet  Refills: 0     senna-docusate 8.6-50 MG tablet  Commonly known as: SENOKOT-S/PERICOLACE      Dose: 1 tablet  Take 1 tablet by mouth 2 times daily  Refills: 0        CONTINUE these medicines which have NOT CHANGED      Dose / Directions   multivitamin w/minerals tablet      Dose: 1 tablet  Take 1 tablet by mouth daily  Refills: 0           Where to get your medicines      These medications were sent to [facility] [address] [address]    Phone: [phone]     oxyCODONE 5 MG tablet     Some of these will need a paper prescription and others can be bought over the counter. Ask your nurse if you have questions.    You don't need a prescription for these medications    acetaminophen 325 MG  tablet    senna-docusate 8.6-50 MG tablet          Discharge Instructions:  Diet: ADAT  Restrictions: No lifting more than 20 pounds   Follow up appointment with Primary Care Physician: No Ref-Primary, Physician  Follow up appointment with General Surgery in 2 weeks

## 2022-03-03 NOTE — PROGRESS NOTES
ASSESSMENT:  1. Sigmoid diverticulitis        Nathaniel Ybarra is a 36 year old male who is s/p laparoscopic colon resection of small bowel takedown postop day 3    PLAN:  Advance diet as tolerated  VIOLETA can be removed prior to discharge  Ok to discharge, orders and recs placed    SUBJECTIVE:   He is doing well, tolerating diet, having BMs, ready to go home      Patient Vitals for the past 24 hrs:   BP Temp Temp src Pulse Resp SpO2 Weight   03/03/22 0739 120/85 98.4  F (36.9  C) Oral 78 16 95 % --   03/02/22 2352 130/67 97.8  F (36.6  C) Oral 77 16 97 % --   03/02/22 1536 120/76 98  F (36.7  C) Oral 92 16 97 % --   03/02/22 1500 -- -- -- -- -- -- 91.2 kg (201 lb 1.6 oz)         PHYSICAL EXAM:  GEN: No acute distress, comfortable  LUNGS: CTA bilaterally  CV:RRR  ABD: soft, mild distention, dressing c/d/i, staples intact, no erythema  Drains: Serous output  Output by Drain (mL) 03/01/22 0700 - 03/01/22 1459 03/01/22 1500 - 03/01/22 2259 03/01/22 2300 - 03/02/22 0659 03/02/22 0700 - 03/02/22 1459 03/02/22 1500 - 03/02/22 2259 03/02/22 2300 - 03/03/22 0659 03/03/22 0700 - 03/03/22 0859   Closed/Suction Drain Lateral RLQ Bulb 15 [place] 70 20 30  30 25       EXT:No cyanosis, edema or obvious abnormalities    03/02 0700 - 03/03 0659  In: 296 [P.O.:240; I.V.:56]  Out: 2305 [Urine:2150; Drains:55]    Admission on 02/28/2022   Component Date Value     WBC Count 02/28/2022 10.4      RBC Count 02/28/2022 4.73      Hemoglobin 02/28/2022 14.6      Hematocrit 02/28/2022 42.7      MCV 02/28/2022 90      MCH 02/28/2022 30.9      MCHC 02/28/2022 34.2      RDW 02/28/2022 11.8      Platelet Count 02/28/2022 186      Sodium 03/01/2022 137      Potassium 03/01/2022 4.6      Chloride 03/01/2022 107      Carbon Dioxide (CO2) 03/01/2022 22      Anion Gap 03/01/2022 8      Urea Nitrogen 03/01/2022 12      Creatinine 03/01/2022 0.92      Calcium 03/01/2022 8.1 (A)     Glucose 03/01/2022 128 (A)     Alkaline Phosphatase 03/01/2022 83      AST  03/01/2022 17      ALT 03/01/2022 30      Protein Total 03/01/2022 6.1      Albumin 03/01/2022 3.2 (A)     Bilirubin Total 03/01/2022 0.6      GFR Estimate 03/01/2022 >90      WBC Count 03/01/2022 9.5      RBC Count 03/01/2022 4.43      Hemoglobin 03/01/2022 13.7      Hematocrit 03/01/2022 40.5      MCV 03/01/2022 91      MCH 03/01/2022 30.9      MCHC 03/01/2022 33.8      RDW 03/01/2022 11.9      Platelet Count 03/01/2022 178      GLUCOSE BY METER POCT 03/02/2022 95           Brad Artis PA-C

## 2022-03-03 NOTE — PLAN OF CARE
[id number]    Problem: Plan of Care - These are the overarching goals to be used throughout the patient stay.    Goal: Optimal Comfort and Wellbeing  Outcome: Ongoing, Progressing  Intervention: Monitor Pain and Promote Comfort    Problem: Bleeding (Bowel Resection)  Goal: Absence of Bleeding  Outcome: Ongoing, Progressing    Pt pleasant and cooperative with cares. Up and walking in halls every hour. Advanced to a regular diet and tolerating well, denies nausea. Had five bowel movements today, soft/loose brown. Abdominal dressing clean/dry/intact, changed by surgical team. VIOLETA intact, 30 mL serosanguinous output. Pt has intermittent abdominal pain, prn oxycodone given once, pt reports effective. Pt's girlfriend here visiting.

## 2022-03-21 ENCOUNTER — OFFICE VISIT (OUTPATIENT)
Dept: SURGERY | Facility: CLINIC | Age: 37
End: 2022-03-21
Payer: COMMERCIAL

## 2022-03-21 DIAGNOSIS — K57.32 DIVERTICULITIS OF COLON: Primary | ICD-10-CM

## 2022-03-21 PROCEDURE — 99024 POSTOP FOLLOW-UP VISIT: CPT | Performed by: SPECIALIST

## 2022-03-21 NOTE — PATIENT INSTRUCTIONS
I, Nathaniel Ybarra, give verbal consent for a resident to be present in today's visit.      Cheryle Cottrell CMA  [facility]  Surgery Clinic [facility]  Weight Management Clinic - [address] [address]  Office: [phone]  Fax: [phone]

## 2022-03-21 NOTE — PROGRESS NOTES
HPI: Pt is here for follow up of a status status post laparoscopic sigmoid colon resection secondary to diverticulitis.  He is doing well. His appetite is good, and bowel function regular.  No fevers or chills. Ambulating without problems.       There were no vitals taken for this visit.      EXAM: This is a  36 year old MAN in no distress  GENERAL: Appears well  CHEST clear  CVS S1S2 NSR  ABDOMEN: Soft, non-tender.  Remove the staples incision looks good Steri-Strips were applied  EXT: warm, moves without difficulty      Assessment/Plan:   Severe diverticulitis with multiple abscesses and asked had a fistula this was resected he is primary anastomosis with an EEA stapler he is done really well.  He is normal diet back to work told him another week he had overhead no heavy lifting strenuous activities and removed the staples today he is doing great from a laparoscopic standpoint of the surgery went well for a recovery standpoint I want him to start on a high-fiber diet here in a couple weeks time.  And told him to supplement with oral Weatherbee Metamucil Citrucel or FiberCon or some type of fiber supplement and then try to work in a higher fiber diet than he has in the past.    Discussed and answered all questions he is comfortable with our plan    No follow-ups on file.    Jonathan Rosales MD ,MD  [facility] Department of Surgery

## 2022-03-21 NOTE — LETTER
3/21/2022         RE: Nathaniel Ybarra  [address] [place] [address]        Dear Colleague,    Thank you for referring your patient, Nathaniel Ybarra, to the [facility] [place]. Please see a copy of my visit note below.    HPI: Pt is here for follow up of a status status post laparoscopic sigmoid colon resection secondary to diverticulitis.  He is doing well. His appetite is good, and bowel function regular.  No fevers or chills. Ambulating without problems.       There were no vitals taken for this visit.      EXAM: This is a  36 year old MAN in no distress  GENERAL: Appears well  CHEST clear  CVS S1S2 NSR  ABDOMEN: Soft, non-tender.  Remove the staples incision looks good Steri-Strips were applied  EXT: warm, moves without difficulty      Assessment/Plan:   Severe diverticulitis with multiple abscesses and asked had a fistula this was resected he is primary anastomosis with an EEA stapler he is done really well.  He is normal diet back to work told him another week he had overhead no heavy lifting strenuous activities and removed the staples today he is doing great from a laparoscopic standpoint of the surgery went well for a recovery standpoint I want him to start on a high-fiber diet here in a couple weeks time.  And told him to supplement with oral Weatherbee Metamucil Citrucel or FiberCon or some type of fiber supplement and then try to work in a higher fiber diet than he has in the past.    Discussed and answered all questions he is comfortable with our plan    No follow-ups on file.    Jonathan Rosales MD ,MD  [facility] Department of Surgery      Again, thank you for allowing me to participate in the care of your patient.        Sincerely,        Jonathan Rosales MD

## 2022-05-01 ENCOUNTER — HEALTH MAINTENANCE LETTER (OUTPATIENT)
Age: 37
End: 2022-05-01

## 2022-09-02 ENCOUNTER — OFFICE VISIT (OUTPATIENT)
Dept: SURGERY | Facility: CLINIC | Age: 37
End: 2022-09-02
Payer: COMMERCIAL

## 2022-09-02 VITALS
WEIGHT: 211 LBS | SYSTOLIC BLOOD PRESSURE: 150 MMHG | HEIGHT: 68 IN | BODY MASS INDEX: 31.98 KG/M2 | DIASTOLIC BLOOD PRESSURE: 90 MMHG

## 2022-09-02 DIAGNOSIS — K42.9 UMBILICAL HERNIA WITHOUT OBSTRUCTION AND WITHOUT GANGRENE: Primary | ICD-10-CM

## 2022-09-02 PROCEDURE — 99213 OFFICE O/P EST LOW 20 MIN: CPT | Performed by: SPECIALIST

## 2022-09-02 RX ORDER — CEFAZOLIN SODIUM/WATER 2 G/20 ML
2 SYRINGE (ML) INTRAVENOUS SEE ADMIN INSTRUCTIONS
Status: CANCELLED | OUTPATIENT
Start: 2022-09-22

## 2022-09-02 RX ORDER — OMEGA-3S/DHA/EPA/FISH OIL 1000-1400
CAPSULE,DELAYED RELEASE (ENTERIC COATED) ORAL
COMMUNITY

## 2022-09-02 RX ORDER — CEFAZOLIN SODIUM/WATER 2 G/20 ML
2 SYRINGE (ML) INTRAVENOUS
Status: CANCELLED | OUTPATIENT
Start: 2022-09-22

## 2022-09-02 RX ORDER — ACETAMINOPHEN 325 MG/1
975 TABLET ORAL ONCE
Status: CANCELLED | OUTPATIENT
Start: 2022-09-22 | End: 2022-09-02

## 2022-09-02 NOTE — LETTER
9/2/2022         RE: Nathaniel Ybarra  [address]  [place] [address]        Dear Colleague,    Thank you for referring your patient, Nathaniel Ybarra, to the [facility] [place]. Please see a copy of my visit note below.    [facility] Surgery Follow up    HPI:    37 year old year old male who returns for a follow up.  He had a laparoscopic sigmoid colon resection done in February of this year.  He had a pretty decent size umbilical hernia fixed secondary to this being a dirty operation with a colon resection I did not use mesh in a distended few stitches of repair for this.  He comes back now this is recurred small defect is umbilicus.    Allergies:Patient has no known allergies.    Past Medical History:   Diagnosis Date     Diverticulitis      NO ACTIVE PROBLEMS      Overweight      Umbilical hernia        Past Surgical History:   Procedure Laterality Date     LAPAROSCOPIC ASSISTED SIGMOID COLECTOMY Left 2/28/2022    Procedure: laparoscopic SIGMOID COLECTOMY, LEFT,;  Surgeon: Jonathan Rosales MD;  Location: [facility] OR     LAPAROSCOPIC HERNIORRHAPHY UMBILICAL N/A 2/28/2022    Procedure: and umbilical hernia repair;  Surgeon: Jonathan Rosales MD;  Location: [facility] OR     NO HISTORY OF SURGERY         CURRENT MEDS:  Current Outpatient Medications   Medication     Fiber Adult Gummies 2 g CHEW     multivitamin w/minerals (THERA-VIT-M) tablet     acetaminophen (TYLENOL) 325 MG tablet     senna-docusate (SENOKOT-S/PERICOLACE) 8.6-50 MG tablet     No current facility-administered medications for this visit.       History reviewed. No pertinent family history.     reports that he quit smoking about 8 months ago. His smoking use included cigarettes. He smoked 0.50 packs per day. He has never used smokeless tobacco. He reports that he does not drink alcohol and does not use drugs.    Review of Systems:  Negative except for local defect just sore and tender when  "he lifts otherwise normal bowels this these days eating a normal diet and having no troubles or problems and supplements with fiber. Otherwise twelve system of review is negative.      OBJECTIVE:  Vitals:    09/02/22 1318   BP: (!) 150/90   Weight: 95.7 kg (211 lb)   Height: 1.727 m (5' 8\")     Body mass index is 32.08 kg/m .    Status: having discomfort    EXAM:  GENERAL: This is a well-developed 37 year old male who appears his stated age  HEAD: normocephalic  HEENT: Pupils equal and reactive bilaterally  CARDIAC: RRR without murmur  CHEST/LUNG:  Clear to auscultation  ABDOMEN: Soft, nontender, nondistended, umbilical defect reducible today  NEUROLOGIC: Focally intact, nonfocal  VASCULAR: Pulses intact, symmetrical upper and lower extremities.         LABS:  Lab Results   Component Value Date    WBC 9.5 03/01/2022    WBC 12.3 02/13/2021    HGB 13.7 03/01/2022    HGB 15.9 02/13/2021    HCT 40.5 03/01/2022    HCT 46.1 02/13/2021    MCV 91 03/01/2022    MCV 88 02/13/2021     03/03/2022     02/13/2021     Lab Results   Component Value Date    ALT 30 03/01/2022    AST 17 03/01/2022    ALKPHOS 83 03/01/2022        Assessment/Plan:   Umbilical hernia without obstruction and without gangrene     Discussed doing repair I will do this and open umbilical repair probably use of mesh.  Discussed the risk and benefits procedure risk infection bleeding recurrence rates and he wished to proceed we will get him set up soon as possible orders have been submitted    No follow-ups on file.     Jonathan Rosales MD ,MD  [facility] Department of Surgery      Again, thank you for allowing me to participate in the care of your patient.        Sincerely,        Jonathan Rosales MD    "

## 2022-09-02 NOTE — PROGRESS NOTES
[facility] Surgery Follow up    HPI:    37 year old year old male who returns for a follow up.  He had a laparoscopic sigmoid colon resection done in February of this year.  He had a pretty decent size umbilical hernia fixed secondary to this being a dirty operation with a colon resection I did not use mesh in a distended few stitches of repair for this.  He comes back now this is recurred small defect is umbilicus.    Allergies:Patient has no known allergies.    Past Medical History:   Diagnosis Date     Diverticulitis      NO ACTIVE PROBLEMS      Overweight      Umbilical hernia        Past Surgical History:   Procedure Laterality Date     LAPAROSCOPIC ASSISTED SIGMOID COLECTOMY Left 2/28/2022    Procedure: laparoscopic SIGMOID COLECTOMY, LEFT,;  Surgeon: Jonathan Rosales MD;  Location: [facility] OR     LAPAROSCOPIC HERNIORRHAPHY UMBILICAL N/A 2/28/2022    Procedure: and umbilical hernia repair;  Surgeon: Jonathan Rosales MD;  Location: [facility] OR     NO HISTORY OF SURGERY         CURRENT MEDS:  Current Outpatient Medications   Medication     Fiber Adult Gummies 2 g CHEW     multivitamin w/minerals (THERA-VIT-M) tablet     acetaminophen (TYLENOL) 325 MG tablet     senna-docusate (SENOKOT-S/PERICOLACE) 8.6-50 MG tablet     No current facility-administered medications for this visit.       History reviewed. No pertinent family history.     reports that he quit smoking about 8 months ago. His smoking use included cigarettes. He smoked 0.50 packs per day. He has never used smokeless tobacco. He reports that he does not drink alcohol and does not use drugs.    Review of Systems:  Negative except for local defect just sore and tender when he lifts otherwise normal bowels this these days eating a normal diet and having no troubles or problems and supplements with fiber. Otherwise twelve system of review is negative.      OBJECTIVE:  Vitals:    09/02/22 1318   BP: (!) 150/90   Weight: 95.7 kg (211 lb)  "  Height: 1.727 m (5' 8\")     Body mass index is 32.08 kg/m .    Status: having discomfort    EXAM:  GENERAL: This is a well-developed 37 year old male who appears his stated age  HEAD: normocephalic  HEENT: Pupils equal and reactive bilaterally  CARDIAC: RRR without murmur  CHEST/LUNG:  Clear to auscultation  ABDOMEN: Soft, nontender, nondistended, umbilical defect reducible today  NEUROLOGIC: Focally intact, nonfocal  VASCULAR: Pulses intact, symmetrical upper and lower extremities.         LABS:  Lab Results   Component Value Date    WBC 9.5 03/01/2022    WBC 12.3 02/13/2021    HGB 13.7 03/01/2022    HGB 15.9 02/13/2021    HCT 40.5 03/01/2022    HCT 46.1 02/13/2021    MCV 91 03/01/2022    MCV 88 02/13/2021     03/03/2022     02/13/2021     Lab Results   Component Value Date    ALT 30 03/01/2022    AST 17 03/01/2022    ALKPHOS 83 03/01/2022        Assessment/Plan:   Umbilical hernia without obstruction and without gangrene     Discussed doing repair I will do this and open umbilical repair probably use of mesh.  Discussed the risk and benefits procedure risk infection bleeding recurrence rates and he wished to proceed we will get him set up soon as possible orders have been submitted    No follow-ups on file.     Jonathan Rosales MD ,MD  [facility] Department of Surgery  "

## 2022-09-20 ENCOUNTER — OFFICE VISIT (OUTPATIENT)
Dept: FAMILY MEDICINE | Facility: CLINIC | Age: 37
End: 2022-09-20
Payer: COMMERCIAL

## 2022-09-20 VITALS
WEIGHT: 211 LBS | SYSTOLIC BLOOD PRESSURE: 124 MMHG | OXYGEN SATURATION: 97 % | HEIGHT: 68 IN | BODY MASS INDEX: 31.98 KG/M2 | DIASTOLIC BLOOD PRESSURE: 90 MMHG | HEART RATE: 68 BPM

## 2022-09-20 DIAGNOSIS — Z11.4 SCREENING FOR HIV (HUMAN IMMUNODEFICIENCY VIRUS): ICD-10-CM

## 2022-09-20 DIAGNOSIS — Z01.818 PREOP GENERAL PHYSICAL EXAM: Primary | ICD-10-CM

## 2022-09-20 DIAGNOSIS — Z11.59 NEED FOR HEPATITIS C SCREENING TEST: ICD-10-CM

## 2022-09-20 DIAGNOSIS — R73.9 ELEVATED BLOOD SUGAR: ICD-10-CM

## 2022-09-20 DIAGNOSIS — Z13.220 SCREENING FOR HYPERLIPIDEMIA: ICD-10-CM

## 2022-09-20 LAB
BASOPHILS # BLD AUTO: 0 10E3/UL (ref 0–0.2)
BASOPHILS NFR BLD AUTO: 1 %
EOSINOPHIL # BLD AUTO: 0.2 10E3/UL (ref 0–0.7)
EOSINOPHIL NFR BLD AUTO: 3 %
ERYTHROCYTE [DISTWIDTH] IN BLOOD BY AUTOMATED COUNT: 12.4 % (ref 10–15)
HBA1C MFR BLD: 5.5 % (ref 0–5.6)
HCT VFR BLD AUTO: 44.6 % (ref 40–53)
HGB BLD-MCNC: 15.4 G/DL (ref 13.3–17.7)
IMM GRANULOCYTES # BLD: 0 10E3/UL
IMM GRANULOCYTES NFR BLD: 0 %
LYMPHOCYTES # BLD AUTO: 2.1 10E3/UL (ref 0.8–5.3)
LYMPHOCYTES NFR BLD AUTO: 27 %
MCH RBC QN AUTO: 30 PG (ref 26.5–33)
MCHC RBC AUTO-ENTMCNC: 34.5 G/DL (ref 31.5–36.5)
MCV RBC AUTO: 87 FL (ref 78–100)
MONOCYTES # BLD AUTO: 0.6 10E3/UL (ref 0–1.3)
MONOCYTES NFR BLD AUTO: 8 %
NEUTROPHILS # BLD AUTO: 4.7 10E3/UL (ref 1.6–8.3)
NEUTROPHILS NFR BLD AUTO: 61 %
PLATELET # BLD AUTO: 191 10E3/UL (ref 150–450)
RBC # BLD AUTO: 5.13 10E6/UL (ref 4.4–5.9)
WBC # BLD AUTO: 7.6 10E3/UL (ref 4–11)

## 2022-09-20 PROCEDURE — 86803 HEPATITIS C AB TEST: CPT | Performed by: FAMILY MEDICINE

## 2022-09-20 PROCEDURE — 87389 HIV-1 AG W/HIV-1&-2 AB AG IA: CPT | Performed by: FAMILY MEDICINE

## 2022-09-20 PROCEDURE — 80053 COMPREHEN METABOLIC PANEL: CPT | Performed by: FAMILY MEDICINE

## 2022-09-20 PROCEDURE — 83036 HEMOGLOBIN GLYCOSYLATED A1C: CPT | Performed by: FAMILY MEDICINE

## 2022-09-20 PROCEDURE — 36415 COLL VENOUS BLD VENIPUNCTURE: CPT | Performed by: FAMILY MEDICINE

## 2022-09-20 PROCEDURE — 90715 TDAP VACCINE 7 YRS/> IM: CPT | Performed by: FAMILY MEDICINE

## 2022-09-20 PROCEDURE — 85025 COMPLETE CBC W/AUTO DIFF WBC: CPT | Performed by: FAMILY MEDICINE

## 2022-09-20 PROCEDURE — 99204 OFFICE O/P NEW MOD 45 MIN: CPT | Mod: 25 | Performed by: FAMILY MEDICINE

## 2022-09-20 PROCEDURE — 90471 IMMUNIZATION ADMIN: CPT | Performed by: FAMILY MEDICINE

## 2022-09-20 NOTE — PATIENT INSTRUCTIONS
Preparing for Your Surgery  Getting started  A nurse will call you to review your health history and instructions. They will give you an arrival time based on your scheduled surgery time. Please be ready to share:    Your doctor's clinic name and phone number    Your medical, surgical and anesthesia history    A list of allergies and sensitivities    A list of medicines, including herbal treatments and over-the-counter drugs    Whether the patient has a legal guardian (ask how to send us the papers in advance)  Please tell us if you're pregnant--or if there's any chance you might be pregnant. Some surgeries may injure a fetus (unborn baby), so they require a pregnancy test. Surgeries that are safe for a fetus don't always need a test, and you can choose whether to have one.   If you have a child who's having surgery, please ask for a copy of Preparing for Your Child's Surgery.    Preparing for surgery    Within 30 days of surgery: Have a pre-op exam (sometimes called an H&P, or History and Physical). This can be done at a clinic or pre-operative center.  ? If you're having a , you may not need this exam. Talk to your care team.    At your pre-op exam, talk to your care team about all medicines you take. If you need to stop any medicines before surgery, ask when to start taking them again.  ? We do this for your safety. Many medicines can make you bleed too much during surgery. Some change how well surgery (anesthesia) drugs work.    Call your insurance company to let them know you're having surgery. (If you don't have insurance, call [phone].)    Call your clinic if there's any change in your health. This includes signs of a cold or flu (sore throat, runny nose, cough, rash, fever). It also includes a scrape or scratch near the surgery site.    If you have questions on the day of surgery, call your hospital or surgery center.  COVID testing  You may need to be tested for COVID-19 before having  surgery. If so, we will give you instructions.  Eating and drinking guidelines  For your safety: Unless your surgeon tells you otherwise, follow the guidelines below.    Eat and drink as usual until 8 hours before surgery. After that, no food or milk.    Drink clear liquids until 2 hours before surgery. These are liquids you can see through, like water, Gatorade and Propel Water. You may also have black coffee and tea (no cream or milk).    Nothing by mouth within 2 hours of surgery. This includes gum, candy and breath mints.    If you drink alcohol: Stop drinking it the night before surgery.    If your care team tells you to take medicine on the morning of surgery, it's okay to take it with a sip of water.  Preventing infection    Shower or bathe the night before and morning of your surgery. Follow the instructions your clinic gave you. (If no instructions, use regular soap.)    Don't shave or clip hair near your surgery site. We'll remove the hair if needed.    Don't smoke or vape the morning of surgery. You may chew nicotine gum up to 2 hours before surgery. A nicotine patch is okay.  ? Note: Some surgeries require you to completely quit smoking and nicotine. Check with your surgeon.    Your care team will make every effort to keep you safe from infection. We will:  ? Clean our hands often with soap and water (or an alcohol-based hand rub).  ? Clean the skin at your surgery site with a special soap that kills germs.  ? Give you a special gown to keep you warm. (Cold raises the risk of infection.)  ? Wear special hair covers, masks, gowns and gloves during surgery.  ? Give antibiotic medicine, if prescribed. Not all surgeries need antibiotics.  What to bring on the day of surgery    Photo ID and insurance card    Copy of your health care directive, if you have one    Glasses and hearing aides (bring cases)  ? You can't wear contacts during surgery    Inhaler and eye drops, if you use them (tell us about these when  you arrive)    CPAP machine or breathing device, if you use them    A few personal items, if spending the night    If you have . . .  ? A pacemaker, ICD (cardiac defibrillator) or other implant: Bring the ID card.  ? An implanted stimulator: Bring the remote control.  ? A legal guardian: Bring a copy of the certified (court-stamped) guardianship papers.  Please remove any jewelry, including body piercings. Leave jewelry and other valuables at home.  If you're going home the day of surgery    You must have a responsible adult drive you home. They should stay with you overnight as well.    If you don't have someone to stay with you, and you aren't safe to go home alone, we may keep you overnight. Insurance often won't pay for this.  After surgery  If it's hard to control your pain or you need more pain medicine, please call your surgeon's office.  Questions?   If you have any questions for your care team, list them here: _________________________________________________________________________________________________________________________________________________________________________ ____________________________________ ____________________________________ ____________________________________  For informational purposes only. Not to replace the advice of your health care provider. Copyright   2003, 2019 [facility]. All rights reserved. Clinically reviewed by Pallavi Yañez MD. [employer] [id number] - REV 07/21.

## 2022-09-20 NOTE — H&P (VIEW-ONLY)
[facility]  [address] [facility] [address]  Phone: [phone]  Fax: [phone]  Primary Provider: No Ref-Primary, Physician  Pre-op Performing Provider: JOSSELIN RAMIREZ      PREOPERATIVE EVALUATION:  Today's date: 9/20/2022    Nathaniel Ybarra is a 37 year old male who presents for a preoperative evaluation.    Surgical Information:  Surgery/Procedure: Hernia   Surgery Location: [facility]   Surgeon: Dr. Rosales   Surgery Date: 09/22/2022  Time of Surgery: 1pm  Where patient plans to recover: At home with family  Fax number for surgical facility: Note does not need to be faxed, will be available electronically in Epic.    Type of Anesthesia Anticipated: General    Assessment & Plan     ICD-10-CM    1. Preop general physical exam  Z01.818 CBC with platelets and differential     Comprehensive metabolic panel (BMP + Alb, Alk Phos, ALT, AST, Total. Bili, TP)     CBC with platelets and differential     Comprehensive metabolic panel (BMP + Alb, Alk Phos, ALT, AST, Total. Bili, TP)   2. Screening for HIV (human immunodeficiency virus)  Z11.4 HIV Antigen Antibody Combo     HIV Antigen Antibody Combo   3. Need for hepatitis C screening test  Z11.59 Hepatitis C antibody     Hepatitis C antibody   4. Screening for hyperlipidemia  Z13.220    5. Elevated blood sugar  R73.9 Hemoglobin A1c     Hemoglobin A1c     Medical decision making: Patient is here today for a preop physical for an umbilical hernia repair.  However noting that he has not had any health maintenance labs done recently, this was discussed with him in detail.  Initially he was resistant but then after reviewing the pros and cons decided to proceed with it.  During hospital admission he has had elevated sugar, agreeable to check A1c as his family history of diabetes in mom.  He has had some elevated ALT in the past and alkaline phosphatase we will check CMP.    He informs me that he was asked to do a COVID test at  home that will suffice.  He would like to do home COVID testing and he will take a picture and send it to the surgeons.    Today he is not fasting and in fact had a red bull current.  Discussed his elevated diastolic blood pressure and the need for cholesterol screening in near future.  Encouraged to establish care with a primary.  Patient is going to move to [place] and plans to follow-up with primary cares.    Declines flu shot and COVID-19 vaccine.  Agrees to do a tetanus vaccine today which was dispensed.    The proposed surgical procedure is considered INTERMEDIATE risk.    Total time spent for patient care was 40 minutes, including discussing preventative health cares, reviewing previous labs and chart review, patient interview and documentation and exam.         Risks and Recommendations:  The patient has the following additional risks and recommendations for perioperative complications:   - No identified additional risk factors other than previously addressed    Medication Instructions:  Patient is on no chronic medications    RECOMMENDATION:  APPROVAL GIVEN to proceed with proposed procedure, without further diagnostic evaluation.        Subjective   Chief Complaint   Patient presents with     Pre-Op Exam     DOS 09/22/2022, hernia surgery with Dr. Rosales @ [facility] related to upcoming procedure: Patient has had surgery for colectomy for sigmoid diverticulitis.  He also has a normal ankle hernia and is going to go for surgical correction with his primary surgeon at [facility].  He otherwise feels well and denies any acute concerns.    Preop Questions 9/20/2022   1. Have you ever had a heart attack or stroke? No   2. Have you ever had surgery on your heart or blood vessels, such as a stent placement, a coronary artery bypass, or surgery on an artery in your head, neck, heart, or legs? No   3. Do you have chest pain with activity? No   4. Do you have a history of  heart failure? No    5. Do you currently have a cold, bronchitis or symptoms of other infection? No   6. Do you have a cough, shortness of breath, or wheezing? No   7. Do you or anyone in your family have previous history of blood clots? No   8. Do you or does anyone in your family have a serious bleeding problem such as prolonged bleeding following surgeries or cuts? No   9. Have you ever had problems with anemia or been told to take iron pills? No   10. Have you had any abnormal blood loss such as black, tarry or bloody stools? No   11. Have you ever had a blood transfusion? No   12. Are you willing to have a blood transfusion if it is medically needed before, during, or after your surgery? Yes   13. Have you or any of your relatives ever had problems with anesthesia? No   14. Do you have sleep apnea, excessive snoring or daytime drowsiness? No   15. Do you have any artifical heart valves or other implanted medical devices like a pacemaker, defibrillator, or continuous glucose monitor? No   16. Do you have artificial joints? No   17. Are you allergic to latex? No       Health Care Directive:  Patient does not have a Health Care Directive or Living Will: Discussed advance care planning with patient; information given to patient to review.    Preoperative Review of :   reviewed - Oxycodone after surgery- 03/22, Diazepam- 10 pills 06/15      Status of Chronic Conditions:  See problem list for active medical problems.  Problems all longstanding and stable, except as noted/documented.  See ROS for pertinent symptoms related to these conditions.      Review of Systems  Constitutional, neuro, ENT, endocrine, pulmonary, cardiac, gastrointestinal, genitourinary, musculoskeletal, integument and psychiatric systems are negative, except as otherwise noted.    Patient Active Problem List    Diagnosis Date Noted     Sigmoid diverticulitis 02/28/2022     Priority: Medium     Diverticulitis of colon 08/12/2021     Priority: Medium     Colonic  "fistula 2021     Priority: Medium      Past Medical History:   Diagnosis Date     Diverticulitis      NO ACTIVE PROBLEMS      Overweight      Umbilical hernia      Past Surgical History:   Procedure Laterality Date     LAPAROSCOPIC ASSISTED SIGMOID COLECTOMY Left 2022    Procedure: laparoscopic SIGMOID COLECTOMY, LEFT,;  Surgeon: Jonathan Rosales MD;  Location: [facility] OR     LAPAROSCOPIC HERNIORRHAPHY UMBILICAL N/A 2022    Procedure: and umbilical hernia repair;  Surgeon: Jonathan Rosales MD;  Location: [facility] OR     NO HISTORY OF SURGERY       Current Outpatient Medications   Medication Sig Dispense Refill     Fiber Adult Gummies 2 g CHEW        multivitamin w/minerals (THERA-VIT-M) tablet Take 1 tablet by mouth daily         No Known Allergies     Social History     Tobacco Use     Smoking status: Former Smoker     Packs/day: 0.50     Types: Cigarettes     Quit date: 2022     Years since quittin.7     Smokeless tobacco: Never Used   Substance Use Topics     Alcohol use: No     Alcohol/week: 0.0 standard drinks     History reviewed. No pertinent family history.  History   Drug Use No         Objective     BP (!) 124/90 (BP Location: Left arm, Patient Position: Sitting, Cuff Size: Adult Large)   Pulse 68   Ht 1.727 m (5' 8\")   Wt 95.7 kg (211 lb)   SpO2 97%   BMI 32.08 kg/m      Physical Exam  GENERAL APPEARANCE: healthy, alert and no distress  HENT: ear canals and TM's normal and nose and mouth without ulcers or lesions  RESP: lungs clear to auscultation - no rales, rhonchi or wheezes  CV: regular rate and rhythm, normal S1 S2, no S3 or S4 and no murmur, click or rub   ABDOMEN: soft, nontender, no HSM or masses and bowel sounds normal  NEURO: Normal strength and tone, sensory exam grossly normal, mentation intact and speech normal    Recent Labs   Lab Test 22  0631 22  0637 22  2253 21  0658 21  0907   HGB  --  13.7 14.6 15.9 16.5   PLT " 168 178 186 193 191   INR  --   --   --   --  1.00   NA  --  137  --  141 141   POTASSIUM  --  4.6  --  3.8 3.7   CR  --  0.92  --  0.82 0.86        Diagnostics:  Labs pending at this time.  Results will be reviewed when available.   No EKG required, no history of coronary heart disease, significant arrhythmia, peripheral arterial disease or other structural heart disease.    Revised Cardiac Risk Index (RCRI):  The patient has the following serious cardiovascular risks for perioperative complications:   - No serious cardiac risks = 0 points     RCRI Interpretation: 0 points: Class I (very low risk - 0.4% complication rate)           Signed Electronically by: Ronnie Hunt MD  Copy of this evaluation report is provided to requesting physician.

## 2022-09-20 NOTE — PROGRESS NOTES
[facility]  [address] [facility] [address]  Phone: [phone]  Fax: [phone]  Primary Provider: No Ref-Primary, Physician  Pre-op Performing Provider: JOSSELIN RAMIREZ      PREOPERATIVE EVALUATION:  Today's date: 9/20/2022    Nathaniel Ybarra is a 37 year old male who presents for a preoperative evaluation.    Surgical Information:  Surgery/Procedure: Hernia   Surgery Location: [facility]   Surgeon: Dr. Rosales   Surgery Date: 09/22/2022  Time of Surgery: 1pm  Where patient plans to recover: At home with family  Fax number for surgical facility: Note does not need to be faxed, will be available electronically in Epic.    Type of Anesthesia Anticipated: General    Assessment & Plan     ICD-10-CM    1. Preop general physical exam  Z01.818 CBC with platelets and differential     Comprehensive metabolic panel (BMP + Alb, Alk Phos, ALT, AST, Total. Bili, TP)     CBC with platelets and differential     Comprehensive metabolic panel (BMP + Alb, Alk Phos, ALT, AST, Total. Bili, TP)   2. Screening for HIV (human immunodeficiency virus)  Z11.4 HIV Antigen Antibody Combo     HIV Antigen Antibody Combo   3. Need for hepatitis C screening test  Z11.59 Hepatitis C antibody     Hepatitis C antibody   4. Screening for hyperlipidemia  Z13.220    5. Elevated blood sugar  R73.9 Hemoglobin A1c     Hemoglobin A1c     Medical decision making: Patient is here today for a preop physical for an umbilical hernia repair.  However noting that he has not had any health maintenance labs done recently, this was discussed with him in detail.  Initially he was resistant but then after reviewing the pros and cons decided to proceed with it.  During hospital admission he has had elevated sugar, agreeable to check A1c as his family history of diabetes in mom.  He has had some elevated ALT in the past and alkaline phosphatase we will check CMP.    He informs me that he was asked to do a COVID test at  home that will suffice.  He would like to do home COVID testing and he will take a picture and send it to the surgeons.    Today he is not fasting and in fact had a red bull current.  Discussed his elevated diastolic blood pressure and the need for cholesterol screening in near future.  Encouraged to establish care with a primary.  Patient is going to move to [place] and plans to follow-up with primary cares.    Declines flu shot and COVID-19 vaccine.  Agrees to do a tetanus vaccine today which was dispensed.    The proposed surgical procedure is considered INTERMEDIATE risk.    Total time spent for patient care was 40 minutes, including discussing preventative health cares, reviewing previous labs and chart review, patient interview and documentation and exam.         Risks and Recommendations:  The patient has the following additional risks and recommendations for perioperative complications:   - No identified additional risk factors other than previously addressed    Medication Instructions:  Patient is on no chronic medications    RECOMMENDATION:  APPROVAL GIVEN to proceed with proposed procedure, without further diagnostic evaluation.        Subjective   Chief Complaint   Patient presents with     Pre-Op Exam     DOS 09/22/2022, hernia surgery with Dr. Rosales @ [facility] related to upcoming procedure: Patient has had surgery for colectomy for sigmoid diverticulitis.  He also has a normal ankle hernia and is going to go for surgical correction with his primary surgeon at [facility].  He otherwise feels well and denies any acute concerns.    Preop Questions 9/20/2022   1. Have you ever had a heart attack or stroke? No   2. Have you ever had surgery on your heart or blood vessels, such as a stent placement, a coronary artery bypass, or surgery on an artery in your head, neck, heart, or legs? No   3. Do you have chest pain with activity? No   4. Do you have a history of  heart failure? No    5. Do you currently have a cold, bronchitis or symptoms of other infection? No   6. Do you have a cough, shortness of breath, or wheezing? No   7. Do you or anyone in your family have previous history of blood clots? No   8. Do you or does anyone in your family have a serious bleeding problem such as prolonged bleeding following surgeries or cuts? No   9. Have you ever had problems with anemia or been told to take iron pills? No   10. Have you had any abnormal blood loss such as black, tarry or bloody stools? No   11. Have you ever had a blood transfusion? No   12. Are you willing to have a blood transfusion if it is medically needed before, during, or after your surgery? Yes   13. Have you or any of your relatives ever had problems with anesthesia? No   14. Do you have sleep apnea, excessive snoring or daytime drowsiness? No   15. Do you have any artifical heart valves or other implanted medical devices like a pacemaker, defibrillator, or continuous glucose monitor? No   16. Do you have artificial joints? No   17. Are you allergic to latex? No       Health Care Directive:  Patient does not have a Health Care Directive or Living Will: Discussed advance care planning with patient; information given to patient to review.    Preoperative Review of :   reviewed - Oxycodone after surgery- 03/22, Diazepam- 10 pills 06/15      Status of Chronic Conditions:  See problem list for active medical problems.  Problems all longstanding and stable, except as noted/documented.  See ROS for pertinent symptoms related to these conditions.      Review of Systems  Constitutional, neuro, ENT, endocrine, pulmonary, cardiac, gastrointestinal, genitourinary, musculoskeletal, integument and psychiatric systems are negative, except as otherwise noted.    Patient Active Problem List    Diagnosis Date Noted     Sigmoid diverticulitis 02/28/2022     Priority: Medium     Diverticulitis of colon 08/12/2021     Priority: Medium     Colonic  "fistula 2021     Priority: Medium      Past Medical History:   Diagnosis Date     Diverticulitis      NO ACTIVE PROBLEMS      Overweight      Umbilical hernia      Past Surgical History:   Procedure Laterality Date     LAPAROSCOPIC ASSISTED SIGMOID COLECTOMY Left 2022    Procedure: laparoscopic SIGMOID COLECTOMY, LEFT,;  Surgeon: Jonathan Rosales MD;  Location: [facility] OR     LAPAROSCOPIC HERNIORRHAPHY UMBILICAL N/A 2022    Procedure: and umbilical hernia repair;  Surgeon: Jonathan Rosales MD;  Location: [facility] OR     NO HISTORY OF SURGERY       Current Outpatient Medications   Medication Sig Dispense Refill     Fiber Adult Gummies 2 g CHEW        multivitamin w/minerals (THERA-VIT-M) tablet Take 1 tablet by mouth daily         No Known Allergies     Social History     Tobacco Use     Smoking status: Former Smoker     Packs/day: 0.50     Types: Cigarettes     Quit date: 2022     Years since quittin.7     Smokeless tobacco: Never Used   Substance Use Topics     Alcohol use: No     Alcohol/week: 0.0 standard drinks     History reviewed. No pertinent family history.  History   Drug Use No         Objective     BP (!) 124/90 (BP Location: Left arm, Patient Position: Sitting, Cuff Size: Adult Large)   Pulse 68   Ht 1.727 m (5' 8\")   Wt 95.7 kg (211 lb)   SpO2 97%   BMI 32.08 kg/m      Physical Exam  GENERAL APPEARANCE: healthy, alert and no distress  HENT: ear canals and TM's normal and nose and mouth without ulcers or lesions  RESP: lungs clear to auscultation - no rales, rhonchi or wheezes  CV: regular rate and rhythm, normal S1 S2, no S3 or S4 and no murmur, click or rub   ABDOMEN: soft, nontender, no HSM or masses and bowel sounds normal  NEURO: Normal strength and tone, sensory exam grossly normal, mentation intact and speech normal    Recent Labs   Lab Test 22  0631 22  0637 22  2253 21  0658 21  0907   HGB  --  13.7 14.6 15.9 16.5   PLT " 168 178 186 193 191   INR  --   --   --   --  1.00   NA  --  137  --  141 141   POTASSIUM  --  4.6  --  3.8 3.7   CR  --  0.92  --  0.82 0.86        Diagnostics:  Labs pending at this time.  Results will be reviewed when available.   No EKG required, no history of coronary heart disease, significant arrhythmia, peripheral arterial disease or other structural heart disease.    Revised Cardiac Risk Index (RCRI):  The patient has the following serious cardiovascular risks for perioperative complications:   - No serious cardiac risks = 0 points     RCRI Interpretation: 0 points: Class I (very low risk - 0.4% complication rate)           Signed Electronically by: Ronnie Hunt MD  Copy of this evaluation report is provided to requesting physician.

## 2022-09-21 LAB
ALBUMIN SERPL BCG-MCNC: 4.4 G/DL (ref 3.5–5.2)
ALP SERPL-CCNC: 128 U/L (ref 40–129)
ALT SERPL W P-5'-P-CCNC: 42 U/L (ref 10–50)
ANION GAP SERPL CALCULATED.3IONS-SCNC: 11 MMOL/L (ref 7–15)
AST SERPL W P-5'-P-CCNC: 29 U/L (ref 10–50)
BILIRUB SERPL-MCNC: 0.4 MG/DL
BUN SERPL-MCNC: 11.1 MG/DL (ref 6–20)
CALCIUM SERPL-MCNC: 9 MG/DL (ref 8.6–10)
CHLORIDE SERPL-SCNC: 107 MMOL/L (ref 98–107)
CREAT SERPL-MCNC: 0.76 MG/DL (ref 0.67–1.17)
DEPRECATED HCO3 PLAS-SCNC: 23 MMOL/L (ref 22–29)
GFR SERPL CREATININE-BSD FRML MDRD: >90 ML/MIN/1.73M2
GLUCOSE SERPL-MCNC: 99 MG/DL (ref 70–99)
HCV AB SERPL QL IA: NONREACTIVE
HIV 1+2 AB+HIV1 P24 AG SERPL QL IA: NONREACTIVE
POTASSIUM SERPL-SCNC: 4.3 MMOL/L (ref 3.4–5.3)
PROT SERPL-MCNC: 7.7 G/DL (ref 6.4–8.3)
SODIUM SERPL-SCNC: 141 MMOL/L (ref 136–145)

## 2022-09-22 ENCOUNTER — ANESTHESIA EVENT (OUTPATIENT)
Dept: SURGERY | Facility: HOSPITAL | Age: 37
End: 2022-09-22
Payer: COMMERCIAL

## 2022-09-22 ENCOUNTER — TELEPHONE (OUTPATIENT)
Dept: SURGERY | Facility: CLINIC | Age: 37
End: 2022-09-22

## 2022-09-22 ENCOUNTER — ANESTHESIA (OUTPATIENT)
Dept: SURGERY | Facility: HOSPITAL | Age: 37
End: 2022-09-22
Payer: COMMERCIAL

## 2022-09-22 NOTE — LETTER
We've received instruction to get you scheduled for a hernia repair with Dr Rosales. We have that arranged as follows:     Pre-op Physical: 09/20/2022 at 10:50 a.m. arrival with Dr. Hunt at [facility]. [facility] [address] [address], [place] [phone]    Surgery Date: 10/06/2022     Location: [facility]. [address] [place], [place] [id number]. Enter through the main doors of the hospital and stop at the Welcome Desk. Someone will direct or escort you to surgery admissions.     Approximate Arrival Time: 1:20 p.m.  (Unless instructed differently by the pre-op call nurse)     Post op: Dr. Rosales's nurse will call you 3-4 days after surgery to see how you're doing. If at any time you have any concerns prior to the phone call from Dr. Rosales's nurse, please call us at our main phone number: [phone].     Prep Tasks and Info:    1. Review your medications with your primary care or prescribing physician; they will advise you which meds to stop and when, and when you can resume taking.  Certain medications like blood thinners need to be stopped in advance of surgery to proceed safely.    2. You must get tested for COVID-19, even if you are vaccinated.    Outpatient Surgery:  If you are going home the same day of surgery, a home rapid antigen Covid-19 test is required 1-2 days before surgery- regardless of your vaccination status.  Take a photo of the negative result and show to the nurse on the day of surgery. If you test positive, contact our office right away to reschedule surgery. You can buy a home Covid-19 Rapid Antigen test at many local pharmacies, or you can order for free at covid.gov/tests.    3. Please shower the evening before and morning of surgery with Hibiclens or Exidine soap.  This can be found at your local pharmacy.    4. Fasting instructions will be provided by the pre-op nurse who will call you 1-3 days before surgery.  Typically we  advise normal food up to 8 hours before surgery then clear liquids only up to 2 hours before surgery then nothing at all by mouth for 2 hours including no gum or candy.  The nurse will review your specific instructions with you at the call.      5. Smoking impacts your body's ability to heal properly.  If you are a smoker, we strongly urge you to stop smoking 4-6 weeks before surgery. Plastic surgery patients are required to be nicotine free for 6-8 weeks before surgery.     6. You will need an adult to drive you home and stay with you 24 hours after surgery. Public transportation or Medical Van Services are not permitted.    7. You may have one family member wait in the lobby at the surgery center during your surgery. Visitor restrictions are subject to change, please verify with the pre-op nurse when they call.    8. If the community sees a new COVID19 surge, your procedure may need to be postponed. We will contact you if this happens. You will be screened for high-risk exposure to Covid-19 during the pre-op call.  We encourage you to quarantine yourself away from any Covid-19 people for 10 days before surgery to avoid possible last minute cancellations.   When you arrive to the surgery center, you will again be screened for COVID19 symptoms. If you screen positive, your surgery will need to be postponed.    9. We always encourage you to notify your insurance any time you have medical tests or procedures scheduled including surgery. The number is usually right on the back of your insurance card. Please call [facility] Cost of Care at [phone] if you'd like a surgery quote.       Call our office if you have any questions! Thank you!

## 2022-09-22 NOTE — TELEPHONE ENCOUNTER
Patient called and left a VM today saying that he's not feeling well and wants to reschedule his surgery. I returned his call and needed to leave a VM as well. I removed his case from the OR schedule and will wait for him to return my call to reschedule.

## 2022-09-22 NOTE — TELEPHONE ENCOUNTER
Patient has been rescheduled for surgery to 10.06.22. Went over information with him again. Let him know I will send an updated surgery confirmation letter to him. Patient in agreement with the plan.

## 2022-09-27 ENCOUNTER — TELEPHONE (OUTPATIENT)
Dept: NURSING | Facility: CLINIC | Age: 37
End: 2022-09-27

## 2022-09-27 NOTE — TELEPHONE ENCOUNTER
Patient calling to request lab results from 9/202022. Shared the following message from Dr. Hunt:        Patient verbalized understanding and had no further questions.    Diana Metzger RN  09/27/22 1:08 PM  [facility] Nurse Advisor

## 2022-10-06 ENCOUNTER — HOSPITAL ENCOUNTER (OUTPATIENT)
Facility: HOSPITAL | Age: 37
Discharge: HOME | End: 2022-10-06
Attending: SPECIALIST | Admitting: SPECIALIST
Payer: COMMERCIAL

## 2022-10-06 VITALS
BODY MASS INDEX: 30.27 KG/M2 | HEART RATE: 63 BPM | SYSTOLIC BLOOD PRESSURE: 137 MMHG | RESPIRATION RATE: 16 BRPM | TEMPERATURE: 98.1 F | DIASTOLIC BLOOD PRESSURE: 73 MMHG | HEIGHT: 70 IN | WEIGHT: 211.44 LBS | OXYGEN SATURATION: 97 %

## 2022-10-06 DIAGNOSIS — K42.9 UMBILICAL HERNIA WITHOUT OBSTRUCTION AND WITHOUT GANGRENE: Primary | ICD-10-CM

## 2022-10-06 PROCEDURE — 250N000025 HC SEVOFLURANE, PER MIN: Performed by: SPECIALIST

## 2022-10-06 PROCEDURE — 250N000011 HC RX IP 250 OP 636: Performed by: ANESTHESIOLOGY

## 2022-10-06 PROCEDURE — 272N000001 HC OR GENERAL SUPPLY STERILE: Performed by: SPECIALIST

## 2022-10-06 PROCEDURE — 360N000075 HC SURGERY LEVEL 2, PER MIN: Performed by: SPECIALIST

## 2022-10-06 PROCEDURE — 250N000013 HC RX MED GY IP 250 OP 250 PS 637: Performed by: ANESTHESIOLOGY

## 2022-10-06 PROCEDURE — 250N000013 HC RX MED GY IP 250 OP 250 PS 637: Performed by: SPECIALIST

## 2022-10-06 PROCEDURE — 250N000009 HC RX 250

## 2022-10-06 PROCEDURE — 999N000141 HC STATISTIC PRE-PROCEDURE NURSING ASSESSMENT: Performed by: SPECIALIST

## 2022-10-06 PROCEDURE — 250N000011 HC RX IP 250 OP 636

## 2022-10-06 PROCEDURE — 49585 PR REPAIR UMBILICAL HERN,5+Y/O,REDUC: CPT | Performed by: SPECIALIST

## 2022-10-06 PROCEDURE — 258N000003 HC RX IP 258 OP 636: Performed by: ANESTHESIOLOGY

## 2022-10-06 PROCEDURE — 370N000017 HC ANESTHESIA TECHNICAL FEE, PER MIN: Performed by: SPECIALIST

## 2022-10-06 PROCEDURE — 710N000009 HC RECOVERY PHASE 1, LEVEL 1, PER MIN: Performed by: SPECIALIST

## 2022-10-06 PROCEDURE — 250N000009 HC RX 250: Performed by: SPECIALIST

## 2022-10-06 PROCEDURE — 710N000012 HC RECOVERY PHASE 2, PER MINUTE: Performed by: SPECIALIST

## 2022-10-06 PROCEDURE — 250N000011 HC RX IP 250 OP 636: Performed by: SPECIALIST

## 2022-10-06 RX ORDER — MEPERIDINE HYDROCHLORIDE 25 MG/ML
12.5 INJECTION INTRAMUSCULAR; INTRAVENOUS; SUBCUTANEOUS
Status: DISCONTINUED | OUTPATIENT
Start: 2022-10-06 | End: 2022-10-06 | Stop reason: HOSPADM

## 2022-10-06 RX ORDER — ONDANSETRON 4 MG/1
4 TABLET, ORALLY DISINTEGRATING ORAL EVERY 30 MIN PRN
Status: DISCONTINUED | OUTPATIENT
Start: 2022-10-06 | End: 2022-10-06 | Stop reason: HOSPADM

## 2022-10-06 RX ORDER — CEFAZOLIN SODIUM/WATER 2 G/20 ML
2 SYRINGE (ML) INTRAVENOUS
Status: COMPLETED | OUTPATIENT
Start: 2022-10-06 | End: 2022-10-06

## 2022-10-06 RX ORDER — HYDROMORPHONE HYDROCHLORIDE 1 MG/ML
0.2 INJECTION, SOLUTION INTRAMUSCULAR; INTRAVENOUS; SUBCUTANEOUS EVERY 5 MIN PRN
Status: DISCONTINUED | OUTPATIENT
Start: 2022-10-06 | End: 2022-10-06 | Stop reason: HOSPADM

## 2022-10-06 RX ORDER — LIDOCAINE HYDROCHLORIDE 10 MG/ML
INJECTION, SOLUTION INFILTRATION; PERINEURAL PRN
Status: DISCONTINUED | OUTPATIENT
Start: 2022-10-06 | End: 2022-10-06 | Stop reason: HOSPADM

## 2022-10-06 RX ORDER — BUPIVACAINE HYDROCHLORIDE AND EPINEPHRINE 2.5; 5 MG/ML; UG/ML
INJECTION, SOLUTION EPIDURAL; INFILTRATION; INTRACAUDAL; PERINEURAL PRN
Status: DISCONTINUED | OUTPATIENT
Start: 2022-10-06 | End: 2022-10-06 | Stop reason: HOSPADM

## 2022-10-06 RX ORDER — CEFAZOLIN SODIUM/WATER 2 G/20 ML
2 SYRINGE (ML) INTRAVENOUS SEE ADMIN INSTRUCTIONS
Status: DISCONTINUED | OUTPATIENT
Start: 2022-10-06 | End: 2022-10-06 | Stop reason: HOSPADM

## 2022-10-06 RX ORDER — PROPOFOL 10 MG/ML
INJECTION, EMULSION INTRAVENOUS PRN
Status: DISCONTINUED | OUTPATIENT
Start: 2022-10-06 | End: 2022-10-06

## 2022-10-06 RX ORDER — FENTANYL CITRATE 50 UG/ML
25 INJECTION, SOLUTION INTRAMUSCULAR; INTRAVENOUS EVERY 5 MIN PRN
Status: DISCONTINUED | OUTPATIENT
Start: 2022-10-06 | End: 2022-10-06 | Stop reason: HOSPADM

## 2022-10-06 RX ORDER — ACETAMINOPHEN 325 MG/1
975 TABLET ORAL ONCE
Status: DISCONTINUED | OUTPATIENT
Start: 2022-10-06 | End: 2022-10-06 | Stop reason: HOSPADM

## 2022-10-06 RX ORDER — LIDOCAINE HYDROCHLORIDE 10 MG/ML
INJECTION, SOLUTION INFILTRATION; PERINEURAL PRN
Status: DISCONTINUED | OUTPATIENT
Start: 2022-10-06 | End: 2022-10-06

## 2022-10-06 RX ORDER — SODIUM CHLORIDE, SODIUM LACTATE, POTASSIUM CHLORIDE, CALCIUM CHLORIDE 600; 310; 30; 20 MG/100ML; MG/100ML; MG/100ML; MG/100ML
INJECTION, SOLUTION INTRAVENOUS CONTINUOUS
Status: DISCONTINUED | OUTPATIENT
Start: 2022-10-06 | End: 2022-10-06 | Stop reason: HOSPADM

## 2022-10-06 RX ORDER — DEXAMETHASONE SODIUM PHOSPHATE 10 MG/ML
INJECTION, SOLUTION INTRAMUSCULAR; INTRAVENOUS PRN
Status: DISCONTINUED | OUTPATIENT
Start: 2022-10-06 | End: 2022-10-06

## 2022-10-06 RX ORDER — ACETAMINOPHEN 325 MG/1
975 TABLET ORAL ONCE
Status: COMPLETED | OUTPATIENT
Start: 2022-10-06 | End: 2022-10-06

## 2022-10-06 RX ORDER — KETOROLAC TROMETHAMINE 30 MG/ML
INJECTION, SOLUTION INTRAMUSCULAR; INTRAVENOUS PRN
Status: DISCONTINUED | OUTPATIENT
Start: 2022-10-06 | End: 2022-10-06

## 2022-10-06 RX ORDER — MAGNESIUM SULFATE 4 G/50ML
4 INJECTION INTRAVENOUS ONCE
Status: COMPLETED | OUTPATIENT
Start: 2022-10-06 | End: 2022-10-06

## 2022-10-06 RX ORDER — FENTANYL CITRATE 50 UG/ML
25 INJECTION, SOLUTION INTRAMUSCULAR; INTRAVENOUS
Status: DISCONTINUED | OUTPATIENT
Start: 2022-10-06 | End: 2022-10-06 | Stop reason: HOSPADM

## 2022-10-06 RX ORDER — ONDANSETRON 2 MG/ML
4 INJECTION INTRAMUSCULAR; INTRAVENOUS EVERY 30 MIN PRN
Status: DISCONTINUED | OUTPATIENT
Start: 2022-10-06 | End: 2022-10-06 | Stop reason: HOSPADM

## 2022-10-06 RX ORDER — DEXMEDETOMIDINE HYDROCHLORIDE 4 UG/ML
INJECTION, SOLUTION INTRAVENOUS PRN
Status: DISCONTINUED | OUTPATIENT
Start: 2022-10-06 | End: 2022-10-06

## 2022-10-06 RX ORDER — FENTANYL CITRATE 50 UG/ML
INJECTION, SOLUTION INTRAMUSCULAR; INTRAVENOUS PRN
Status: DISCONTINUED | OUTPATIENT
Start: 2022-10-06 | End: 2022-10-06

## 2022-10-06 RX ORDER — LIDOCAINE 40 MG/G
CREAM TOPICAL
Status: DISCONTINUED | OUTPATIENT
Start: 2022-10-06 | End: 2022-10-06 | Stop reason: HOSPADM

## 2022-10-06 RX ORDER — ONDANSETRON 2 MG/ML
INJECTION INTRAMUSCULAR; INTRAVENOUS PRN
Status: DISCONTINUED | OUTPATIENT
Start: 2022-10-06 | End: 2022-10-06

## 2022-10-06 RX ORDER — OXYCODONE HYDROCHLORIDE 5 MG/1
5 TABLET ORAL EVERY 4 HOURS PRN
Status: DISCONTINUED | OUTPATIENT
Start: 2022-10-06 | End: 2022-10-06 | Stop reason: HOSPADM

## 2022-10-06 RX ORDER — HYDROCODONE BITARTRATE AND ACETAMINOPHEN 5; 325 MG/1; MG/1
1-2 TABLET ORAL EVERY 6 HOURS PRN
Qty: 18 TABLET | Refills: 0 | Status: SHIPPED | OUTPATIENT
Start: 2022-10-06 | End: 2022-10-09

## 2022-10-06 RX ADMIN — SODIUM CHLORIDE, POTASSIUM CHLORIDE, SODIUM LACTATE AND CALCIUM CHLORIDE 100 ML/HR: 600; 310; 30; 20 INJECTION, SOLUTION INTRAVENOUS at 14:01

## 2022-10-06 RX ADMIN — PROPOFOL 200 MG: 10 INJECTION, EMULSION INTRAVENOUS at 14:36

## 2022-10-06 RX ADMIN — DEXAMETHASONE SODIUM PHOSPHATE 10 MG: 10 INJECTION, SOLUTION INTRAMUSCULAR; INTRAVENOUS at 14:42

## 2022-10-06 RX ADMIN — DEXMEDETOMIDINE 4 MCG: 100 INJECTION, SOLUTION, CONCENTRATE INTRAVENOUS at 14:51

## 2022-10-06 RX ADMIN — ONDANSETRON 4 MG: 2 INJECTION INTRAMUSCULAR; INTRAVENOUS at 14:42

## 2022-10-06 RX ADMIN — OXYCODONE HYDROCHLORIDE 5 MG: 5 TABLET ORAL at 16:06

## 2022-10-06 RX ADMIN — KETOROLAC TROMETHAMINE 15 MG: 30 INJECTION, SOLUTION INTRAMUSCULAR at 15:24

## 2022-10-06 RX ADMIN — FENTANYL CITRATE 50 MCG: 50 INJECTION, SOLUTION INTRAMUSCULAR; INTRAVENOUS at 14:42

## 2022-10-06 RX ADMIN — MAGNESIUM SULFATE HEPTAHYDRATE 4 G: 80 INJECTION, SOLUTION INTRAVENOUS at 13:48

## 2022-10-06 RX ADMIN — LIDOCAINE HYDROCHLORIDE 5 ML: 10 INJECTION, SOLUTION INFILTRATION; PERINEURAL at 14:36

## 2022-10-06 RX ADMIN — MIDAZOLAM 2 MG: 1 INJECTION INTRAMUSCULAR; INTRAVENOUS at 14:27

## 2022-10-06 RX ADMIN — Medication 2 G: at 14:31

## 2022-10-06 RX ADMIN — DEXMEDETOMIDINE 4 MCG: 100 INJECTION, SOLUTION, CONCENTRATE INTRAVENOUS at 14:57

## 2022-10-06 RX ADMIN — DEXMEDETOMIDINE 4 MCG: 100 INJECTION, SOLUTION, CONCENTRATE INTRAVENOUS at 15:10

## 2022-10-06 RX ADMIN — ACETAMINOPHEN 975 MG: 325 TABLET, FILM COATED ORAL at 13:45

## 2022-10-06 RX ADMIN — FENTANYL CITRATE 50 MCG: 50 INJECTION, SOLUTION INTRAMUSCULAR; INTRAVENOUS at 15:02

## 2022-10-06 RX ADMIN — PROPOFOL 200 MG: 10 INJECTION, EMULSION INTRAVENOUS at 14:38

## 2022-10-06 RX ADMIN — FENTANYL CITRATE 50 MCG: 50 INJECTION, SOLUTION INTRAMUSCULAR; INTRAVENOUS at 14:36

## 2022-10-06 ASSESSMENT — LIFESTYLE VARIABLES: TOBACCO_USE: 1

## 2022-10-06 ASSESSMENT — ACTIVITIES OF DAILY LIVING (ADL)
ADLS_ACUITY_SCORE: 35
ADLS_ACUITY_SCORE: 33
ADLS_ACUITY_SCORE: 35

## 2022-10-06 NOTE — OP NOTE
Operative Note    Name:  Nathaniel Ybarra  PCP:  Ronnie Hunt  Procedure Date:  10/6/2022      Procedure(s):  HERNIORRHAPHY, UMBILICAL, OPEN    Pre-Procedure Diagnosis:  Umbilical hernia without obstruction and without gangrene [K42.9]     Post-Procedure Diagnosis:    umbilical hernia    OR staff:  Surgeon(s):  Jonathan Rosales MD  Medical student Tara Lookaylynn MS#  Circulator: Katy Hsu RN  Relief Circulator: Anne Mac RN  Scrub Person: Blanca Naik Karen      Anesthesia Type:  General    Past Medical History:   Diagnosis Date     Diverticulitis      NO ACTIVE PROBLEMS      Overweight      Umbilical hernia        Patient Active Problem List    Diagnosis Date Noted     Umbilical hernia without obstruction and without gangrene 10/06/2022     Priority: Medium     Sigmoid diverticulitis 02/28/2022     Priority: Medium     Diverticulitis of colon 08/12/2021     Priority: Medium     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 Taken to the operating room. LMA anesthesia was administered by anesthesia staff.  Patient's abdomen prepped and draped sterile manner.  A briefing timeout was performed anesthesia and or staff.  I began by making incision superior to the umbilicus and, following the old or older incision and in a curvilinear fashion.  Easily could see a hernia sac which was dissected out and this is reduced.  I then brought the fascial areas which are quite strong together with an 0 Ethibond suture times 3 and then closed the deep space with 3-0 Vicryl and the skin with a 4 Monocryl subcuticular stitch.  Steri-Strip sterile dressings were applied and He was transferred to PACU in stable condition        Estimated Blood Loss:   3 ml    Specimens:    None       Drains:   none    Complications:    None    Jonathan Rosales MD     Date: 10/6/2022  Time: 3:35 PM

## 2022-10-06 NOTE — INTERVAL H&P NOTE
"I have reviewed the surgical (or preoperative) H&P that is linked to this encounter, and examined the patient. There are no significant changes        Jonathan Rosales MD  General Surgery [phone]  Vascular Surgery [phone]          Clinical Conditions Present on Arrival:  Clinically Significant Risk Factors Present on Admission                   # Obesity: Estimated body mass index is 30.34 kg/m  as calculated from the following:    Height as of this encounter: 1.778 m (5' 10\").    Weight as of this encounter: 95.9 kg (211 lb 7 oz).       "

## 2022-10-06 NOTE — ANESTHESIA POSTPROCEDURE EVALUATION
Patient: Nathaniel Ybarra    Procedure: Procedure(s):  HERNIORRHAPHY, UMBILICAL, OPEN       Anesthesia Type:  General    Note:  Disposition: Outpatient   Postop Pain Control: Uneventful            Sign Out: Well controlled pain   PONV: No   Neuro/Psych: Uneventful            Sign Out: Acceptable/Baseline neuro status   Airway/Respiratory: Uneventful            Sign Out: Acceptable/Baseline resp. status   CV/Hemodynamics: Uneventful            Sign Out: Acceptable CV status; No obvious hypovolemia; No obvious fluid overload   Other NRE: NONE   DID A NON-ROUTINE EVENT OCCUR? No           Last vitals:  Vitals Value Taken Time   /64 10/06/22 1545   Temp 36.9  C (98.4  F) 10/06/22 1533   Pulse 65 10/06/22 1600   Resp 22 10/06/22 1600   SpO2 95 % 10/06/22 1600   Vitals shown include unvalidated device data.    Electronically Signed By: Keyshawn Ray MD  October 6, 2022  4:01 PM

## 2022-10-06 NOTE — ANESTHESIA CARE TRANSFER NOTE
Patient: Nathaniel Ybarra    Procedure: Procedure(s):  HERNIORRHAPHY, UMBILICAL, OPEN       Diagnosis: Umbilical hernia without obstruction and without gangrene [K42.9]  Diagnosis Additional Information: No value filed.    Anesthesia Type:   General     Note:    Oropharynx: oropharynx clear of all foreign objects  Level of Consciousness: drowsy  Oxygen Supplementation: face mask  Level of Supplemental Oxygen (L/min / FiO2): 6  Independent Airway: airway patency satisfactory and stable  Dentition: dentition unchanged  Vital Signs Stable: post-procedure vital signs reviewed and stable  Report to RN Given: handoff report given  Patient transferred to: PACU  Comments: Patient transferred to PACU by CRNA. Report given to PACU RN, all questions answered. Patient hemodynamically stable. CRNA ensured PACU RN was comfortable taking over care.  Handoff Report: Identifed the Patient, Identified the Reponsible Provider, Reviewed the pertinent medical history, Discussed the surgical course, Reviewed Intra-OP anesthesia mangement and issues during anesthesia, Set expectations for post-procedure period and Allowed opportunity for questions and acknowledgement of understanding      Vitals:  Vitals Value Taken Time   /62 10/06/22 1533   Temp 98.4    Pulse 59 10/06/22 1534   Resp 15 10/06/22 1534   SpO2 97 % 10/06/22 1534   Vitals shown include unvalidated device data.    Electronically Signed By: BUSHRA Wilson CRNA  October 6, 2022  3:36 PM

## 2022-10-06 NOTE — ANESTHESIA PREPROCEDURE EVALUATION
Anesthesia Pre-Procedure Evaluation    Patient: Nathaniel Ybarra   MRN: [id number] : 1985        Procedure : Procedure(s):  HERNIORRHAPHY, UMBILICAL, OPEN          Past Medical History:   Diagnosis Date     Diverticulitis      NO ACTIVE PROBLEMS      Overweight      Umbilical hernia       Past Surgical History:   Procedure Laterality Date     LAPAROSCOPIC ASSISTED SIGMOID COLECTOMY Left 2022    Procedure: laparoscopic SIGMOID COLECTOMY, LEFT,;  Surgeon: Jonathan Rosales MD;  [place] [facility] [place]     LAPAROSCOPIC HERNIORRHAPHY UMBILICAL N/A 2022    Procedure: and umbilical hernia repair;  Surgeon: Jonathan Rosales MD;  [facility] [facility] [place]     NO HISTORY OF SURGERY        No Known Allergies   Social History     Tobacco Use     Smoking status: Former Smoker     Packs/day: 0.50     Types: Cigarettes     Quit date: 2022     Years since quittin.7     Smokeless tobacco: Never Used   Substance Use Topics     Alcohol use: No     Alcohol/week: 0.0 standard drinks      Wt Readings from Last 1 Encounters:   10/06/22 95.9 kg (211 lb 7 oz)        Anesthesia Evaluation   Pt has had prior anesthetic.         ROS/MED HX  ENT/Pulmonary:  - neg pulmonary ROS   (+) tobacco use, Past use,     Neurologic:  - neg neurologic ROS     Cardiovascular:  - neg cardiovascular ROS     METS/Exercise Tolerance:     Hematologic:  - neg hematologic  ROS     Musculoskeletal:  - neg musculoskeletal ROS     GI/Hepatic: Comment: diverticulitis - neg GI/hepatic ROS     Renal/Genitourinary:  - neg Renal ROS     Endo:  - neg endo ROS   (+) Obesity,     Psychiatric/Substance Use:  - neg psychiatric ROS     Infectious Disease:  - neg infectious disease ROS     Malignancy:  - neg malignancy ROS     Other:  - neg other ROS          Physical Exam    Airway  airway exam normal      Mallampati: I   TM distance: > 3 FB   Neck ROM: full   Mouth opening: > 3 cm    Respiratory Devices and Support         Dental         B=Bridge,  C=Chipped, L=Loose, M=Missing    Cardiovascular   cardiovascular exam normal       Rhythm and rate: regular and normal     Pulmonary   pulmonary exam normal        breath sounds clear to auscultation           OUTSIDE LABS:  CBC:   Lab Results   Component Value Date    WBC 7.6 09/20/2022    WBC 9.5 03/01/2022    HGB 15.4 09/20/2022    HGB 13.7 03/01/2022    HCT 44.6 09/20/2022    HCT 40.5 03/01/2022     09/20/2022     03/03/2022     BMP:   Lab Results   Component Value Date     09/20/2022     03/01/2022    POTASSIUM 4.3 09/20/2022    POTASSIUM 4.6 03/01/2022    CHLORIDE 107 09/20/2022    CHLORIDE 107 03/01/2022    CO2 23 09/20/2022    CO2 22 03/01/2022    BUN 11.1 09/20/2022    BUN 12 03/01/2022    CR 0.76 09/20/2022    CR 0.92 03/01/2022    GLC 99 09/20/2022    GLC 95 03/02/2022     COAGS:   Lab Results   Component Value Date    PTT 36 08/12/2021    INR 1.00 08/12/2021     POC: No results found for: BGM, HCG, HCGS  HEPATIC:   Lab Results   Component Value Date    ALBUMIN 4.4 09/20/2022    PROTTOTAL 7.7 09/20/2022    ALT 42 09/20/2022    AST 29 09/20/2022    ALKPHOS 128 09/20/2022    BILITOTAL 0.4 09/20/2022     OTHER:   Lab Results   Component Value Date    A1C 5.5 09/20/2022    FRANCHESCA 9.0 09/20/2022    LIPASE <9 08/12/2021       Anesthesia Plan    ASA Status:  2   NPO Status:  NPO Appropriate    Anesthesia Type: General.     - Airway: ETT              Consents    Anesthesia Plan(s) and associated risks, benefits, and realistic alternatives discussed. Questions answered and patient/representative(s) expressed understanding.    - Discussed:     - Discussed with:  Patient      - Extended Intubation/Ventilatory Support Discussed: No.         Postoperative Care    Pain management: IV analgesics.   PONV prophylaxis: Ondansetron (or other 5HT-3), Dexamethasone or Solumedrol     Comments:                Keyshawn Ray MD

## 2022-10-06 NOTE — ANESTHESIA PROCEDURE NOTES
Airway       Patient location during procedure: OR  Staff -        CRNA: Joel Berrios APRN CRNA       Performed By: CRNA  Consent for Airway        Urgency: elective  Indications and Patient Condition       Indications for airway management: colette-procedural       Induction type:intravenous       Mask difficulty assessment: 0 - not attempted    Final Airway Details       Final airway type: supraglottic airway    Supraglottic Airway Details        Type: LMA       Brand: Ambu AuraGain       LMA size: 5    Post intubation assessment        Placement verified by: capnometry, equal breath sounds and chest rise        Number of attempts at approach: 1       Number of other approaches attempted: 0       Secured with: silk tape       Ease of procedure: easy       Dentition: Intact and Unchanged

## 2022-11-21 ENCOUNTER — HEALTH MAINTENANCE LETTER (OUTPATIENT)
Age: 37
End: 2022-11-21

## 2023-06-02 ENCOUNTER — HEALTH MAINTENANCE LETTER (OUTPATIENT)
Age: 38
End: 2023-06-02

## 2023-08-01 ENCOUNTER — HOSPITAL ENCOUNTER (EMERGENCY)
Facility: HOSPITAL | Age: 38
Discharge: HOME | End: 2023-08-01
Admitting: EMERGENCY MEDICINE
Payer: COMMERCIAL

## 2023-08-01 ENCOUNTER — APPOINTMENT (OUTPATIENT)
Dept: CT IMAGING | Facility: HOSPITAL | Age: 38
End: 2023-08-01
Attending: EMERGENCY MEDICINE
Payer: COMMERCIAL

## 2023-08-01 VITALS
BODY MASS INDEX: 33.74 KG/M2 | DIASTOLIC BLOOD PRESSURE: 93 MMHG | OXYGEN SATURATION: 97 % | HEIGHT: 67 IN | WEIGHT: 215 LBS | SYSTOLIC BLOOD PRESSURE: 129 MMHG | RESPIRATION RATE: 12 BRPM | TEMPERATURE: 98.4 F | HEART RATE: 70 BPM

## 2023-08-01 DIAGNOSIS — R10.9 FLANK PAIN: ICD-10-CM

## 2023-08-01 LAB
ALBUMIN SERPL BCG-MCNC: 4.2 G/DL (ref 3.5–5.2)
ALBUMIN UR-MCNC: NEGATIVE MG/DL
ALP SERPL-CCNC: 140 U/L (ref 40–129)
ALT SERPL W P-5'-P-CCNC: 53 U/L (ref 0–70)
ANION GAP SERPL CALCULATED.3IONS-SCNC: 9 MMOL/L (ref 7–15)
APPEARANCE UR: CLEAR
AST SERPL W P-5'-P-CCNC: 31 U/L (ref 0–45)
BILIRUB DIRECT SERPL-MCNC: <0.2 MG/DL (ref 0–0.3)
BILIRUB SERPL-MCNC: 0.3 MG/DL
BILIRUB UR QL STRIP: NEGATIVE
BUN SERPL-MCNC: 8.9 MG/DL (ref 6–20)
CALCIUM SERPL-MCNC: 8.9 MG/DL (ref 8.6–10)
CHLORIDE SERPL-SCNC: 105 MMOL/L (ref 98–107)
COLOR UR AUTO: YELLOW
CREAT SERPL-MCNC: 0.76 MG/DL (ref 0.67–1.17)
DEPRECATED HCO3 PLAS-SCNC: 24 MMOL/L (ref 22–29)
ERYTHROCYTE [DISTWIDTH] IN BLOOD BY AUTOMATED COUNT: 12.1 % (ref 10–15)
GFR SERPL CREATININE-BSD FRML MDRD: >90 ML/MIN/1.73M2
GLUCOSE SERPL-MCNC: 148 MG/DL (ref 70–99)
GLUCOSE UR STRIP-MCNC: NEGATIVE MG/DL
HCT VFR BLD AUTO: 44.3 % (ref 40–53)
HGB BLD-MCNC: 15.5 G/DL (ref 13.3–17.7)
HGB UR QL STRIP: NEGATIVE
KETONES UR STRIP-MCNC: NEGATIVE MG/DL
LEUKOCYTE ESTERASE UR QL STRIP: NEGATIVE
LIPASE SERPL-CCNC: 16 U/L (ref 13–60)
MCH RBC QN AUTO: 30.8 PG (ref 26.5–33)
MCHC RBC AUTO-ENTMCNC: 35 G/DL (ref 31.5–36.5)
MCV RBC AUTO: 88 FL (ref 78–100)
MUCOUS THREADS #/AREA URNS LPF: PRESENT /LPF
NITRATE UR QL: NEGATIVE
PH UR STRIP: 5.5 [PH] (ref 5–7)
PLATELET # BLD AUTO: 190 10E3/UL (ref 150–450)
POTASSIUM SERPL-SCNC: 4.1 MMOL/L (ref 3.4–5.3)
PROT SERPL-MCNC: 7.3 G/DL (ref 6.4–8.3)
RBC # BLD AUTO: 5.04 10E6/UL (ref 4.4–5.9)
RBC URINE: <1 /HPF
SODIUM SERPL-SCNC: 138 MMOL/L (ref 136–145)
SP GR UR STRIP: 1.02 (ref 1–1.03)
UROBILINOGEN UR STRIP-MCNC: <2 MG/DL
WBC # BLD AUTO: 6.7 10E3/UL (ref 4–11)
WBC URINE: 1 /HPF

## 2023-08-01 PROCEDURE — 250N000011 HC RX IP 250 OP 636: Mod: JZ | Performed by: EMERGENCY MEDICINE

## 2023-08-01 PROCEDURE — 81001 URINALYSIS AUTO W/SCOPE: CPT | Performed by: EMERGENCY MEDICINE

## 2023-08-01 PROCEDURE — 36415 COLL VENOUS BLD VENIPUNCTURE: CPT | Performed by: EMERGENCY MEDICINE

## 2023-08-01 PROCEDURE — 83690 ASSAY OF LIPASE: CPT | Performed by: EMERGENCY MEDICINE

## 2023-08-01 PROCEDURE — 85027 COMPLETE CBC AUTOMATED: CPT | Performed by: EMERGENCY MEDICINE

## 2023-08-01 PROCEDURE — 74177 CT ABD & PELVIS W/CONTRAST: CPT

## 2023-08-01 PROCEDURE — 82310 ASSAY OF CALCIUM: CPT | Performed by: EMERGENCY MEDICINE

## 2023-08-01 PROCEDURE — 99285 EMERGENCY DEPT VISIT HI MDM: CPT | Mod: 25

## 2023-08-01 PROCEDURE — 82248 BILIRUBIN DIRECT: CPT | Performed by: EMERGENCY MEDICINE

## 2023-08-01 RX ORDER — IOPAMIDOL 755 MG/ML
90 INJECTION, SOLUTION INTRAVASCULAR ONCE
Status: COMPLETED | OUTPATIENT
Start: 2023-08-01 | End: 2023-08-01

## 2023-08-01 RX ORDER — LIDOCAINE 50 MG/G
1 PATCH TOPICAL EVERY 24 HOURS
Qty: 10 PATCH | Refills: 0 | Status: SHIPPED | OUTPATIENT
Start: 2023-08-01 | End: 2023-08-11

## 2023-08-01 RX ORDER — CYCLOBENZAPRINE HCL 10 MG
10 TABLET ORAL 3 TIMES DAILY PRN
Qty: 20 TABLET | Refills: 0 | Status: SHIPPED | OUTPATIENT
Start: 2023-08-01 | End: 2023-08-08

## 2023-08-01 RX ADMIN — IOPAMIDOL 90 ML: 755 INJECTION, SOLUTION INTRAVENOUS at 13:35

## 2023-08-01 ASSESSMENT — ACTIVITIES OF DAILY LIVING (ADL): ADLS_ACUITY_SCORE: 35

## 2023-08-01 NOTE — ED PROVIDER NOTES
EMERGENCY DEPARTMENT ENCOUNTER      NAME: Nathaniel Ybarra  AGE: 38 year old male  YOB: 1985  MRN: [id number]  EVALUATION DATE & TIME: 8/1/2023 11:54 AM    PCP: Ronnie Hunt    ED PROVIDER: Blossom Craft PA-C      Chief Complaint   Patient presents with    Chest Wall Pain         FINAL IMPRESSION:  1. Flank pain        MEDICAL DECISION MAKING:    Pertinent Labs & Imaging studies reviewed. (See chart for details)  38 year old male presents to the Emergency Department for evaluation of side/flank pain. for the past 3 days the patient has had pain in his right side/flank that has become worse especially with coughing and pressing on the area and was concerned so he presented to the emergency department.  On my evaluation, the patient was initially hypertensive at 160/106 but otherwise vitally stable.  Examination with tenderness to palpation of the right side of the abdomen laterally, just underneath the rib cage without overlying ecchymosis or erythema.  Mild right flank tenderness to palpation but no CVA tenderness.  No other abdominal tenderness to palpation.  Heart and regular rate and rhythm and lungs clear to auscultation bilaterally.  Differential diagnosis included PE, musculoskeletal pain, cholecystitis, hepatitis, pancreatitis, other intra-abdominal pathology, musculoskeletal pain.    CBC without any derangement.  BMP without any significant derangement.  LFTs with slightly elevated alk phos of 140 but no other significant derangements.  Lipase is normal at 16.  UA without any signs of infection.  At this time, with significant tenderness to palpation in that area I did feel CT of the abdomen pelvis was indicated at this time.  This was discussed with the patient he was in agreement understanding after risk-benefit discussion.  Patient is PERC negative and this would be atypical for PE however, I do not feel D-dimer or CT PE study is indicated as he is PERC negative.  CT abdomen pelvis was  independently interpreted by myself and did not show any obvious abnormalities.  Formal read without any acute intra-abdominal process to explain the patient's symptoms.  At this time, I do feel that the patient is likely having pain due to musculoskeletal etiology.  This was discussed with the patient and he was reassured by labs and CT imaging here.  We will discharge home with Flexeril, lidocaine patches and instructed to take Tylenol and ibuprofen as needed for pain.  We discussed signs and symptoms to return to the emergency department and close follow-up with primary care if symptoms are not improving.  All questions were answered best my ability and he was discharged in stable condition.    Medical Decision Making    History:  Supplemental history from: Documented in chart, if applicable  External Record(s) reviewed: Documented in chart, if applicable.    Work Up:  Chart documentation includes differential considered and any EKGs or imaging independently interpreted by provider, where specified.  In additional to work up documented, I considered the following work up: Documented in chart, if applicable.    External consultation:  Discussion of management with another provider: Documented in chart, if applicable    Complicating factors:  Care impacted by chronic illness: N/A  Care affected by social determinants of health: Access to Medical Care    Disposition considerations: Discharge. I prescribed additional prescription strength medication(s) as charted. I considered admission, but ultimately discharged patient due to reassuring labs and imaging.         ED COURSE:  12:06 PM I met with the patient, obtained history, performed an initial exam, and discussed options and plan for diagnostics and treatment here in the ED.    2:25 PM Patient discharged after being provided with extensive anticipatory guidance and given return precautions, importance of PCP follow-up emphasized.    At the conclusion of the  encounter I discussed the results of all of the tests and the disposition. The questions were answered. The patient or family acknowledged understanding and was agreeable with the care plan.     MEDICATIONS GIVEN IN THE EMERGENCY:  Medications   iopamidol (ISOVUE-370) solution 90 mL (90 mLs Intravenous $Given 8/1/23 9095)       NEW PRESCRIPTIONS STARTED AT TODAY'S ER VISIT  Discharge Medication List as of 8/1/2023  2:26 PM        START taking these medications    Details   cyclobenzaprine (FLEXERIL) 10 MG tablet Take 1 tablet (10 mg) by mouth 3 times daily as needed for muscle spasms, Disp-20 tablet, R-0, Local Print      lidocaine (LIDODERM) 5 % patch Place 1 patch onto the skin every 24 hours for 10 daysDisp-10 patch, R-0Local Print                  =================================================================    HPI:    Patient information was obtained from: the patient    Use of Interpretor: N/A      Nathanieldano Ybarra is a 38 year old male with a pertinent history of colonic fistula, sigmoid diverticulitis, and umbilical hernia, who presents to this ED for evaluation of chest wall pain.    For the past three days, the patient has had right sided abdominal/flank pain. This pain has been worsening since onset. It is not present at rest, but when he coughs or touches the area the pain is present. He also notes that there is a small bump in the area as well. Pain does not change with deep inspiration or movement. He has not been taking tylenol or ibuprofen at home. LBM this morning, normal. The patient denies chest pain, shortness of breath, abdominal pain, dysuria, hematuria, hemoptysis, fever, nausea, vomiting, appetite change, black or bloody stool, and any other symptoms at this time.    REVIEW OF SYSTEMS:  Negative unless otherwise stated in the above HPI.     PAST MEDICAL HISTORY:  Past Medical History:   Diagnosis Date    Diverticulitis     NO ACTIVE PROBLEMS     Overweight     Umbilical hernia        PAST  "SURGICAL HISTORY:  Past Surgical History:   Procedure Laterality Date    HERNIORRHAPHY UMBILICAL N/A 10/6/2022    Procedure: HERNIORRHAPHY, UMBILICAL, OPEN;  Surgeon: Jonathan Rosales MD;  Location: [facility]    LAPAROSCOPIC ASSISTED SIGMOID COLECTOMY Left 2022    Procedure: laparoscopic SIGMOID COLECTOMY, LEFT,;  Surgeon: Jonathan Rosales MD;  Location: [facility] OR    LAPAROSCOPIC HERNIORRHAPHY UMBILICAL N/A 2022    Procedure: and umbilical hernia repair;  Surgeon: Jonathan Rosales MD;  Location: [facility] OR    NO HISTORY OF SURGERY             CURRENT MEDICATIONS:    No current facility-administered medications for this encounter.    Current Outpatient Medications:     cyclobenzaprine (FLEXERIL) 10 MG tablet, Take 1 tablet (10 mg) by mouth 3 times daily as needed for muscle spasms, Disp: 20 tablet, Rfl: 0    lidocaine (LIDODERM) 5 % patch, Place 1 patch onto the skin every 24 hours for 10 days, Disp: 10 patch, Rfl: 0    Fiber Adult Gummies 2 g CHEW, , Disp: , Rfl:     multivitamin w/minerals (THERA-VIT-M) tablet, Take 1 tablet by mouth daily, Disp: , Rfl:       ALLERGIES:  No Known Allergies    FAMILY HISTORY:  No family history on file.    SOCIAL HISTORY:   Social History     Socioeconomic History    Marital status: Single   Tobacco Use    Smoking status: Former     Packs/day: 0.50     Types: Cigarettes     Quit date: 2022     Years since quittin.5    Smokeless tobacco: Never   Substance and Sexual Activity    Alcohol use: No     Alcohol/week: 0.0 standard drinks of alcohol    Drug use: No   Social History Narrative    Has 5 kids 19,17, 15, 10 and 3    Ronnie Hunt MD       VITALS:  Patient Vitals for the past 24 hrs:   BP Temp Temp src Pulse Resp SpO2 Height Weight   23 1414 (!) 129/93 -- -- 70 -- 97 % -- --   23 1154 (!) 160/106 -- -- -- -- -- -- --   23 1152 -- 98.4  F (36.9  C) Temporal 92 12 97 % 1.702 m (5' 7\") 97.5 kg (215 lb)       PHYSICAL " EXAM    Constitutional: Well developed, Well nourished, NAD  HENT: Normocephalic, Atraumatic, Bilateral external ears normal, Oropharynx normal, mucous membranes moist, Nose normal.   Neck: Normal range of motion, No tenderness, Supple, No stridor.  Eyes: PERRL, EOMI, Conjunctiva normal, No discharge.   Respiratory: Normal breath sounds, No respiratory distress, No wheezing, Speaks full sentences easily. No cough.  Cardiovascular: Normal heart rate, Regular rhythm, No murmurs, No rubs, No gallops. Chest wall nontender.  GI: Soft, Tenderness to palpation of the right flank area just underneath the rib cage, No masses. No rebound or guarding.  Musculoskeletal: Good range of motion in all major joints.  Integument: Warm, Dry, No erythema, No rash. No petechiae.  Neurologic: Alert & oriented x 3, Normal motor function, Normal sensory function, No focal deficits noted. Normal gait.  Psychiatric: Affect normal, Judgment normal, Mood normal. Cooperative.    LAB:  All pertinent labs reviewed and interpreted.  Recent Results (from the past 24 hour(s))   CBC (+ platelets, no diff)    Collection Time: 08/01/23 12:22 PM   Result Value Ref Range    WBC Count 6.7 4.0 - 11.0 10e3/uL    RBC Count 5.04 4.40 - 5.90 10e6/uL    Hemoglobin 15.5 13.3 - 17.7 g/dL    Hematocrit 44.3 40.0 - 53.0 %    MCV 88 78 - 100 fL    MCH 30.8 26.5 - 33.0 pg    MCHC 35.0 31.5 - 36.5 g/dL    RDW 12.1 10.0 - 15.0 %    Platelet Count 190 150 - 450 10e3/uL   Basic metabolic panel    Collection Time: 08/01/23 12:22 PM   Result Value Ref Range    Sodium 138 136 - 145 mmol/L    Potassium 4.1 3.4 - 5.3 mmol/L    Chloride 105 98 - 107 mmol/L    Carbon Dioxide (CO2) 24 22 - 29 mmol/L    Anion Gap 9 7 - 15 mmol/L    Urea Nitrogen 8.9 6.0 - 20.0 mg/dL    Creatinine 0.76 0.67 - 1.17 mg/dL    Calcium 8.9 8.6 - 10.0 mg/dL    Glucose 148 (H) 70 - 99 mg/dL    GFR Estimate >90 >60 mL/min/1.73m2   Hepatic function panel    Collection Time: 08/01/23 12:22 PM   Result Value  Ref Range    Protein Total 7.3 6.4 - 8.3 g/dL    Albumin 4.2 3.5 - 5.2 g/dL    Bilirubin Total 0.3 <=1.2 mg/dL    Alkaline Phosphatase 140 (H) 40 - 129 U/L    AST 31 0 - 45 U/L    ALT 53 0 - 70 U/L    Bilirubin Direct <0.20 0.00 - 0.30 mg/dL   Lipase    Collection Time: 08/01/23 12:22 PM   Result Value Ref Range    Lipase 16 13 - 60 U/L   UA with Microscopic reflex to Culture    Collection Time: 08/01/23  1:07 PM    Specimen: Urine, Clean Catch   Result Value Ref Range    Color Urine Yellow Colorless, Straw, Light Yellow, Yellow    Appearance Urine Clear Clear    Glucose Urine Negative Negative mg/dL    Bilirubin Urine Negative Negative    Ketones Urine Negative Negative mg/dL    Specific Gravity Urine 1.023 1.001 - 1.030    Blood Urine Negative Negative    pH Urine 5.5 5.0 - 7.0    Protein Albumin Urine Negative Negative mg/dL    Urobilinogen Urine <2.0 <2.0 mg/dL    Nitrite Urine Negative Negative    Leukocyte Esterase Urine Negative Negative    Mucus Urine Present (A) None Seen /LPF    RBC Urine <1 <=2 /HPF    WBC Urine 1 <=5 /HPF         RADIOLOGY:  Reviewed all pertinent imaging. Please see official radiology report.  CT Abdomen Pelvis w Contrast   Final Result   IMPRESSION:    1.  No etiology identified to explain patient's symptoms.          I, Armida Martinez, am serving as a scribe to document services personally performed by Blossom Craft PA-C based on my observation and the provider's statements to me. I, Blossom Craft PA-C attest that Armida Martinez is acting in a scribe capacity, has observed my performance of the services and has documented them in accordance with my direction.    Blossom Craft PA-C  Emergency Medicine  [facility]  8/1/2023       Blossom Craft PA-C  08/01/23 [id number]

## 2023-08-01 NOTE — DISCHARGE INSTRUCTIONS
You were seen and evaluated here in the ED for evaluation of flank pain. Fortunately, here work up is reassuring. At this time, it is unclear the cause of your symptoms but at this time with reassuring vitals, CT and labs I do feel it is likely musculoskeletal in etiology. I recommend tylenol and ibuprofen for pain.     If symptoms are not improving, I recommend close follow-up with primary care.     If you develop severe worsening pain, coughing up blood, chest pain, shortness of breath, vomiting, fevers or other concerns I recommend return to the ED.

## 2023-08-01 NOTE — ED TRIAGE NOTES
The pt arrives with right side rib pain, that improves when he pushes on the ribs. Pt states that it has gone on 3 days and has become more tender. Pt has not tried ibuprophen or tylenol at this time. Pt states it is worse when he coughs.

## 2024-06-22 ENCOUNTER — HEALTH MAINTENANCE LETTER (OUTPATIENT)
Age: 39
End: 2024-06-22

## 2025-07-12 ENCOUNTER — HEALTH MAINTENANCE LETTER (OUTPATIENT)
Age: 40
End: 2025-07-12

## (undated) DEVICE — SU VICRYL+ 3-0 27IN SH UND VCP416H

## (undated) DEVICE — ESU PENCIL SMOKE EVAC W/ROCKER SWITCH 0703-047-000

## (undated) DEVICE — PREP DURAPREP 26ML APL 8630

## (undated) DEVICE — Device

## (undated) DEVICE — TUBING LAP SUCT/IRRIG STRYKER 250070500

## (undated) DEVICE — LUBRICANT SURG 2 OZ STRL FLIP TOP 2OZLUB

## (undated) DEVICE — TUBING SUCTION MEDI-VAC 1/4"X20' N620A

## (undated) DEVICE — DRAPE OR LAPAROTOMY KC 89228*

## (undated) DEVICE — DRAIN RESERVOIR 100ML JP 0070740

## (undated) DEVICE — DECANTER VIAL 2006S

## (undated) DEVICE — COTTON BALL 2IN STRL C15000-300

## (undated) DEVICE — GOWN XLG DISP 9545

## (undated) DEVICE — CUSTOM PACK COLON CLOSING SBA5BCCHEA

## (undated) DEVICE — LEGGINGS 31X48" LF KM89408

## (undated) DEVICE — STPL SKIN 35W 6.9MM  PXW35

## (undated) DEVICE — TUBING SMOKE EVAC PNEUMOCLEAR HIGH FLOW 0620050250

## (undated) DEVICE — GLOVE BIOGEL PI ORTHOPRO SZ 7.5 47675

## (undated) DEVICE — SU MONOCRYL+ 4-0 18IN PS2 UND MCP496G

## (undated) DEVICE — SU SILK 2-0 SH 30" K833H

## (undated) DEVICE — ENDO SHEARS RENEW LAP ENDOCUT SCISSOR TIP 16.5MM 3142

## (undated) DEVICE — BLADE KNIFE SURG 15 371115

## (undated) DEVICE — STPL RELOAD REG TISSUE ECHELON 60 X 3.6MM BLUE GST60B

## (undated) DEVICE — ENDO TROCAR FIRST ENTRY KII FIOS Z-THRD 12X100MM CTF73

## (undated) DEVICE — PREP POVIDONE-IODINE 7.5% SCRUB 4OZ BOTTLE MDS093945

## (undated) DEVICE — DRSG STERI STRIP 1/2X4" R1547

## (undated) DEVICE — SYSTEM CLEARIFY VISUALIZATION 21-345

## (undated) DEVICE — ENDO TROCAR SLEEVE KII Z-THREADED 05X100MM CTS02

## (undated) DEVICE — TRAY PREP DRY SKIN SCRUB 067

## (undated) DEVICE — CATH TRAY FOLEY SURESTEP 16FR LF A947316

## (undated) DEVICE — PLATE GROUNDING ADULT W/CORD 9165L

## (undated) DEVICE — CUSTOM PACK GEN MAJOR SBA5BGMHEA

## (undated) DEVICE — SUCTION TIP YANKAUER W/O VENT K86

## (undated) DEVICE — SOL WATER IRRIG 1000ML BOTTLE 2F7114

## (undated) DEVICE — CONTAINER URINE SPEC 4OZ STRL 1053

## (undated) DEVICE — GLOVE UNDER INDICATOR PI SZ 7.0 LF 41670

## (undated) DEVICE — GLOVE UNDER INDICATOR PI SZ 6.5 LF 41665

## (undated) DEVICE — PREP POVIDONE IODINE SOLUTION 10% 4OZ BOTTLE 29906-004

## (undated) DEVICE — DRESSING BANDAID SURFING CAT ABN4075D

## (undated) DEVICE — SU ETHIBOND 0 CT-1 30" X424H

## (undated) DEVICE — NEEDLE HYPO 21 X 2 200318

## (undated) DEVICE — DRSG GAUZE 4X4" 3033

## (undated) DEVICE — ENDO TROCAR FIRST ENTRY KII FIOS Z-THRD 11X100MM CTF33

## (undated) DEVICE — PREP CHLORAPREP 26ML TINTED HI-LITE ORANGE 930815

## (undated) DEVICE — SOL NACL 0.9% IRRIG 1000ML BOTTLE 2F7124

## (undated) DEVICE — SUTURE PDS 0 60IN CTX+ LOOPED PDP990G

## (undated) DEVICE — STPL CIRCULAR 29MM CVD CDH29P

## (undated) DEVICE — DRSG DRAIN 4X4" 7086

## (undated) DEVICE — BULB W/BLADDER TUBING STRL DISP

## (undated) DEVICE — STPL POWERED ECHELON 60MM PSEE60A

## (undated) DEVICE — SU PROLENE 4-0 SHDA 36" 8521H

## (undated) DEVICE — DRSG STERI STRIP 1/4X3" R1541

## (undated) DEVICE — ESU LIGASURE MARYLAND LAPAROSCOPIC SLR/DVDR 5MMX37CM LF1937

## (undated) DEVICE — SUCTION MANIFOLD NEPTUNE 2 SYS 1 PORT 702-025-000

## (undated) DEVICE — PAD POS XL 1X20X40IN PINK PIGAZZI

## (undated) DEVICE — SU SILK 3-0 SH CR 8X18" C013D

## (undated) DEVICE — KIT ENDO FIRST STEP DISINFECTANT 200ML W/POUCH EP-4

## (undated) DEVICE — ESU PENCIL W/HOLSTER E2350H

## (undated) DEVICE — DRSG PRIMAPORE 03 1/8X6" 66000318

## (undated) DEVICE — DRESSING PRIMAPORE 4 X 3-1/8 66000317

## (undated) DEVICE — SUCTION TIP POOLE STERILE 35040

## (undated) DEVICE — CUSTOM PACK LAP CHOLE SBA5BLCHEA

## (undated) DEVICE — GLOVE BIOGEL PI ULTRATOUCH G SZ 6.0 42160

## (undated) DEVICE — ENDO TROCAR FIRST ENTRY KII FIOS Z-THRD 05X100MM CTF03

## (undated) DEVICE — SU SILK 2-0 TIE 18' A185H

## (undated) DEVICE — DRSG GAUZE 4X4" TOPPER

## (undated) DEVICE — SUTURE VICRYL+ 3-0 27IN CT-1 UND VCP258H

## (undated) DEVICE — GLOVE BIOGEL PI ULTRATOUCH G SZ 6.5 42165

## (undated) DEVICE — GOWN IMPERVIOUS BREATHABLE SMART LG 89015

## (undated) RX ORDER — DEXAMETHASONE SODIUM PHOSPHATE 10 MG/ML
INJECTION, EMULSION INTRAMUSCULAR; INTRAVENOUS
Status: DISPENSED
Start: 2022-02-28

## (undated) RX ORDER — KETOROLAC TROMETHAMINE 30 MG/ML
INJECTION, SOLUTION INTRAMUSCULAR; INTRAVENOUS
Status: DISPENSED
Start: 2022-02-28

## (undated) RX ORDER — FENTANYL CITRATE 50 UG/ML
INJECTION, SOLUTION INTRAMUSCULAR; INTRAVENOUS
Status: DISPENSED
Start: 2022-02-28

## (undated) RX ORDER — KETOROLAC TROMETHAMINE 30 MG/ML
INJECTION, SOLUTION INTRAMUSCULAR; INTRAVENOUS
Status: DISPENSED
Start: 2022-10-06

## (undated) RX ORDER — PROPOFOL 10 MG/ML
INJECTION, EMULSION INTRAVENOUS
Status: DISPENSED
Start: 2022-02-28

## (undated) RX ORDER — BUPIVACAINE HYDROCHLORIDE AND EPINEPHRINE 2.5; 5 MG/ML; UG/ML
INJECTION, SOLUTION INFILTRATION; PERINEURAL
Status: DISPENSED
Start: 2022-10-06

## (undated) RX ORDER — LIDOCAINE HYDROCHLORIDE 10 MG/ML
INJECTION, SOLUTION EPIDURAL; INFILTRATION; INTRACAUDAL; PERINEURAL
Status: DISPENSED
Start: 2022-10-06

## (undated) RX ORDER — BUPIVACAINE HYDROCHLORIDE 2.5 MG/ML
INJECTION, SOLUTION INFILTRATION; PERINEURAL
Status: DISPENSED
Start: 2022-02-28

## (undated) RX ORDER — FENTANYL CITRATE-0.9 % NACL/PF 10 MCG/ML
PLASTIC BAG, INJECTION (ML) INTRAVENOUS
Status: DISPENSED
Start: 2022-02-28

## (undated) RX ORDER — ONDANSETRON 2 MG/ML
INJECTION INTRAMUSCULAR; INTRAVENOUS
Status: DISPENSED
Start: 2022-02-28

## (undated) RX ORDER — FENTANYL CITRATE 50 UG/ML
INJECTION, SOLUTION INTRAMUSCULAR; INTRAVENOUS
Status: DISPENSED
Start: 2022-10-06